# Patient Record
Sex: MALE | Race: WHITE | NOT HISPANIC OR LATINO | URBAN - METROPOLITAN AREA
[De-identification: names, ages, dates, MRNs, and addresses within clinical notes are randomized per-mention and may not be internally consistent; named-entity substitution may affect disease eponyms.]

---

## 2017-07-13 DIAGNOSIS — J98.01 BRONCHOSPASM: ICD-10-CM

## 2017-07-13 RX ORDER — ALBUTEROL SULFATE 90 UG/1
2 AEROSOL, METERED RESPIRATORY (INHALATION) EVERY 6 HOURS PRN
Qty: 1 INHALER | Refills: 1 | Status: SHIPPED | OUTPATIENT
Start: 2017-07-13 | End: 2019-02-12

## 2017-07-13 NOTE — TELEPHONE ENCOUNTER
Request for medication refill:    Date of last visit at clinic: 9-23-16    Please complete refill if appropriate and CLOSE ENCOUNTER.    Closing the encounter signifies the refill is complete.    If refill has been denied, please complete the smart phrase .smirefuse and route it to the [facility] RN TRIAGE pool to inform the patient and the pharmacy.    Maria M White

## 2017-08-04 DIAGNOSIS — L85.3 XEROSIS OF SKIN: ICD-10-CM

## 2017-08-04 RX ORDER — AMMONIUM LACTATE 12 G/100G
CREAM TOPICAL 2 TIMES DAILY PRN
Qty: 385 G | Refills: 12 | Status: SHIPPED | OUTPATIENT
Start: 2017-08-04 | End: 2018-12-10

## 2017-08-04 NOTE — TELEPHONE ENCOUNTER
Last Visit:12/22/16  Next Visit: none     Assessment & Plan:  1) Hx of Melanoma in situ  - Annual skin check recommended  - No evidence of recurrent disease     2) Hx of NMSC  - No evidence of recurrent disease     RTC in 1 year for f/u, earlier for new or concerning lesions    Eber Otero MD, FAAD    Departments of Internal Medicine and Dermatology  [facility]  [phone]

## 2017-10-06 ENCOUNTER — OFFICE VISIT (OUTPATIENT)
Dept: FAMILY MEDICINE | Facility: CLINIC | Age: 62
End: 2017-10-06

## 2017-10-06 VITALS
RESPIRATION RATE: 16 BRPM | WEIGHT: 201.6 LBS | HEART RATE: 61 BPM | BODY MASS INDEX: 26.72 KG/M2 | TEMPERATURE: 98.2 F | OXYGEN SATURATION: 97 % | HEIGHT: 73 IN | DIASTOLIC BLOOD PRESSURE: 80 MMHG | SYSTOLIC BLOOD PRESSURE: 125 MMHG

## 2017-10-06 DIAGNOSIS — Z00.00 HEALTHCARE MAINTENANCE: ICD-10-CM

## 2017-10-06 DIAGNOSIS — Z00.00 ENCOUNTER FOR ROUTINE ADULT HEALTH EXAMINATION WITHOUT ABNORMAL FINDINGS: Primary | ICD-10-CM

## 2017-10-06 DIAGNOSIS — R20.8 BURNING SENSATION OF FEET: ICD-10-CM

## 2017-10-06 DIAGNOSIS — C43.9 MALIGNANT MELANOMA, UNSPECIFIED SITE (H): ICD-10-CM

## 2017-10-06 LAB
CHOLEST SERPL-MCNC: 209.7 MG/DL (ref 0–200)
CHOLEST/HDLC SERPL: 5.8 {RATIO} (ref 0–5)
GLUCOSE CASUAL: 105 MG/DL (ref 51–200)
HCV AB SERPL QL IA: NONREACTIVE
HDLC SERPL-MCNC: 36.2 MG/DL
LDLC SERPL CALC-MCNC: 144 MG/DL (ref 0–129)
PSA SERPL-ACNC: 2.59 UG/L (ref 0–4)
TRIGL SERPL-MCNC: 145.5 MG/DL (ref 0–150)
VLDL CHOLESTEROL: 29.1 MG/DL (ref 7–32)

## 2017-10-06 NOTE — PROGRESS NOTES
Male Physical Note          HPI         Concerns today:   1. Ear - crusty feeling in R ear.  2. Foot- 2/3rd toes with burning sensation on dorsum x 2 months. Got new shoes, but now does not wear. Mother with neuropathy.           Patient Active Problem List   Diagnosis     Hepatitis B immune     Melanoma (H)     Bronchospasm     BCC (basal cell carcinoma), face     LTBI (latent tuberculosis infection)     ACP (advance care planning)       Past Medical History:   Diagnosis Date     Basal cell carcinoma      Cancer (H)     melanoma - L arm - 2006     Malignant melanoma (H)      Malignant melanoma nos             Family History   Problem Relation Age of Onset     Prostate Cancer Father      Musculoskeletal Disorder Father      Asthma Son      Allergies Son      Asthma Son      Allergies Son      Asthma Son      Peripheral Neuropathy Mother      Skin Cancer No family hx of               Review of Systems:     Review of Systems:  CONSTITUTIONAL: NEGATIVE for fever, chills, change in weight  INTEGUMENTARY/SKIN: H/O MELANOMA, BCC  EYES: NEGATIVE for vision changes or irritation  ENT/MOUTH: NEGATIVE for ear, mouth and throat problems  RESP: H/O BRONCHOSPASM WITH EXERTION  CV: NEGATIVE for chest pain, palpitations or peripheral edema  GI: NEGATIVE for nausea, abdominal pain, heartburn, or change in bowel habits  : NEGATIVE for frequency, dysuria, or hematuria  MUSCULOSKELETAL: NEGATIVE for significant arthralgias or myalgia  NEURO: TOES WITH BURNING  ENDOCRINE: NEGATIVE for temperature intolerance, skin/hair changes  HEME/ALLERGY: NEGATIVE for bleeding problems  PSYCHIATRIC: NEGATIVE for changes in mood or affect  Sleep:   Do you snore most or the night (as reported by a family member)? No  Do you feel sleepy or extremely tired during most of the day? 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 Wife,     Has anyone hurt you physically, for example by pushing, hitting, slapping or kicking you or forcing you to have sex? Denies  Do you feel threatened or controlled by a partner, ex-partner or anyone in your life? Denies    Sexual Health     Sexual concerns: No   STI History: Neg      Recommended Screening     Cholesterol Level (>44 yo or at risk):  Recommended and patient accepted testing.  Colon CA Screening (>50-75 ):  UTD             Physical Exam:     Vitals: /80  Pulse 61  Temp 98.2  F (36.8  C) (Oral)  Resp 16  Ht 6' 1\" (185.4 cm)  Wt 201 lb 9.6 oz (91.4 kg)  SpO2 97%  BMI 26.6 kg/m2  BMI= Body mass index is 26.6 kg/(m^2).     Wt Readings from Last 5 Encounters:   10/06/17 201 lb 9.6 oz (91.4 kg)   09/23/16 202 lb (91.6 kg)   10/05/15 201 lb (91.2 kg)   10/11/13 194 lb 12.8 oz (88.4 kg)       GENERAL: healthy, alert and no distress  EYES: Eyes grossly normal to inspection, extraocular movements - intact, and PERRL  HENT: ear canals- cerumen in R canal; TMs- normal; Nose- normal; Mouth- no ulcers, no lesions  NECK: no tenderness, no adenopathy, no asymmetry, no masses, no stiffness; thyroid- normal to palpation  RESP: lungs clear to auscultation - no rales, no rhonchi, no wheezes  CV: regular rates and rhythm, normal S1 S2, no S3 or S4 and no murmur, no click or rub " -  ABDOMEN: soft, no tenderness, no  hepatosplenomegaly, no masses, normal bowel sounds  MS: extremities- no gross deformities noted, no edema  SKIN: no suspicious lesions, no rashes  NEURO: strength and tone- normal, sensory exam- grossly normal, mentation- intact, speech- normal, reflexes- symmetric; 2nd/3rd toes intact to monofilament although quality slightly different compared to other areas  BACK: no CVA tenderness, no paralumbar tenderness  - male: testicles- normal, no atrophy, no masses;  no inguinal hernias  PSYCH: Alert and oriented times 3; speech- coherent , normal rate and volume; able to articulate logical thoughts, able to abstract reason, no tangential thoughts, no hallucinations or delusions, affect- normal  LYMPHATICS: ant. cervical- normal, post. cervical- normal, axillary- normal, supraclavicular- normal, inguinal- normal    Assessment and Plan     1. Encounter for routine adult health examination without abnormal findings  - Glucose Casual ([facility])  - Hepatitis C antibody  - Lipid [place] ([facility])  - Prostate spec antigen screen    2. BCC (basal cell carcinoma), face  Dx in last year  F/u with derm     3. Melanoma (H)  10 yr anniversary.   F/u with derm      4. Healthcare maintenance  - VITAMIN D, CHOLECALCIFEROL, PO; Take 2,000 Units by mouth daily  - ADMIN VACCINE, INITIAL  - TDAP VACCINE (BOOSTRIX)      5. Burning sensation of feet  2nd/3rd dorsum of toes. Monitor        Options for treatment and follow-up care were reviewed with the patient. Juan Miguel Donovan and/or guardian engaged in the decision making process and verbalized understanding of the options discussed and agreed with the final plan.    Brent Gonzalez MD

## 2017-10-06 NOTE — PROGRESS NOTES
HPI:           Review of Systems:   Review of Systems          Physical Exam:     Physical Exam      Results:     Assessment and Plan

## 2017-10-06 NOTE — MR AVS SNAPSHOT
After Visit Summary   10/6/2017    Juan Miguel Donovan    MRN: [id number]           Patient Information     Date Of Birth          1955        Visit Information        Provider Department      10/6/2017 8:00 AM Brent Gonzalez MD [facility]        Today's Diagnoses     Screening for condition    -  1    Healthcare maintenance          Care Instructions      Preventive Health Recommendations  Male Ages 50 - 64    Yearly exam:             See your health care provider every year in order to  o   Review health changes.   o   Discuss preventive care.    o   Review your medicines if your doctor has prescribed any.     Have a cholesterol test every 5 years, or more frequently if you are at risk for high cholesterol/heart disease.     Have a diabetes test (fasting glucose) every three years. If you are at risk for diabetes, you should have this test more often.     Have a colonoscopy at age 50, or have a yearly FIT test (stool test). These exams will check for colon cancer.      Talk with your health care provider about whether or not a prostate cancer screening test (PSA) is right for you.    You should be tested each year for STDs (sexually transmitted diseases), if you re at risk.     Shots: Get a flu shot each year. Get a tetanus shot every 10 years.     Nutrition:    Eat at least 5 servings of fruits and vegetables daily.     Eat whole-grain bread, whole-wheat pasta and brown rice instead of white grains and rice.     Talk to your provider about Calcium and Vitamin D.     Lifestyle    Exercise for at least 150 minutes a week (30 minutes a day, 5 days a week). This will help you control your weight and prevent disease.     Limit alcohol to one drink per day.     No smoking.     Wear sunscreen to prevent skin cancer.     See your dentist every six months for an exam and cleaning.     See your eye doctor every 1 to 2 years.            Follow-ups after your visit        Who to contact   "   Please call your clinic at [phone] to:    Ask questions about your health    Make or cancel appointments    Discuss your medicines    Learn about your test results    Speak to your doctor   If you have compliments or concerns about an experience at your clinic, or if you wish to file a complaint, please contact [facility] Patient Relations at [phone] or email us at [email or web address]         Additional Information About Your Visit        GetMyRxhart Information     Allovuet gives you secure access to your electronic health record. If you see a primary care provider, you can also send messages to your care team and make appointments. If you have questions, please call your primary care clinic.  If you do not have a primary care provider, please call [phone] and they will assist you.      Glovico is an electronic gateway that provides easy, online access to your medical records. With Glovico, you can request a clinic appointment, read your test results, renew a prescription or communicate with your care team.     To access your existing account, please contact your [facility] or call [phone] for assistance.        Care EveryWhere ID     This is your Care EveryWhere ID. This could be used by other organizations to access your [facility] [place] medical records  [id number]        Your Vitals Were     Pulse Temperature Respirations Height Pulse Oximetry BMI (Body Mass Index)    61 98.2  F (36.8  C) (Oral) 16 6' 1\" (185.4 cm) 97% 26.6 kg/m2       Blood Pressure from Last 3 Encounters:   10/06/17 125/80   09/23/16 120/79   01/15/16 121/72    Weight from Last 3 Encounters:   10/06/17 201 lb 9.6 oz (91.4 kg)   09/23/16 202 lb (91.6 kg)   10/05/15 201 lb (91.2 kg)              We Performed the Following     ADMIN VACCINE, INITIAL     Glucose Casual ([facility])     Hepatitis C antibody     Lipid [place] ([facility])     Prostate spec antigen screen  "    TDAP VACCINE (BOOSTRIX)        Primary Care Provider Office Phone # Fax #    Brent Gonzalez [phone]       [address] [address]        Equal Access to Services     MASSIEL MANRIQUE : Hadii wayne nelson chevy Fuentes, waaxda luqadaha, qaybta kaalmada adejackelyn, nicole silver laJanettemara hansen. So [facility] [phone].    ATENCIÓN: Si habla español, tiene a bliss disposición servicios gratuitos de asistencia lingüística. [facility] [phone].    We comply with applicable federal civil rights laws and [place] laws. We do not discriminate on the basis of race, color, national origin, age, disability, sex, sexual orientation, or gender identity.            Thank you!     Thank you for choosing [facility]  for your care. Our goal is always to provide you with excellent care. Hearing back from our patients is one way we can continue to improve our services. Please take a few minutes to complete the written survey that you may receive in the mail after your visit with us. Thank you!             Your Updated Medication List - Protect others around you: Learn how to safely use, store and throw away your medicines at www.disposemymeds.org.          This list is accurate as of: 10/6/17  8:45 AM.  Always use your most recent med list.                   Brand Name Dispense Instructions for use Diagnosis    albuterol 108 (90 BASE) MCG/ACT Inhaler    PROAIR HFA/PROVENTIL HFA/VENTOLIN HFA    1 Inhaler    Inhale 2 puffs into the lungs every 6 hours as needed for shortness of breath / dyspnea    Bronchospasm       ammonium lactate 12 % cream    LAC-HYDRIN    385 g    Apply topically 2 times daily as needed for dry skin    Xerosis of skin       MULTIVITAMIN PO      Take 1 tablet by mouth daily    Routine history and physical examination of adult       VITAMIN D (CHOLECALCIFEROL) PO      Take 2,000 Units by mouth daily

## 2017-10-06 NOTE — PROGRESS NOTES
Male Physical Note          HPI            Review of Systems:            Social History         Sexual Health       Recommended Screening              Physical Exam:         Assessment and Plan

## 2017-11-20 ENCOUNTER — MYC MEDICAL ADVICE (OUTPATIENT)
Dept: FAMILY MEDICINE | Facility: CLINIC | Age: 62
End: 2017-11-20

## 2018-04-05 ENCOUNTER — OFFICE VISIT (OUTPATIENT)
Dept: DERMATOLOGY | Facility: CLINIC | Age: 63
End: 2018-04-05
Payer: COMMERCIAL

## 2018-04-05 DIAGNOSIS — L30.0 NUMMULAR ECZEMA: ICD-10-CM

## 2018-04-05 DIAGNOSIS — L57.0 AK (ACTINIC KERATOSIS): Primary | ICD-10-CM

## 2018-04-05 ASSESSMENT — PAIN SCALES - GENERAL: PAINLEVEL: NO PAIN (0)

## 2018-04-05 NOTE — LETTER
4/5/2018       RE: Juan Miguel Donovan  [address]  [place] [address]     Dear Colleague,    Thank you for referring your patient, Juan Miguel Donovan, to the [facility] DERMATOLOGY at [facility]. Please see a copy of my visit note below.    [facility] Dermatology Note      Dermatology Problem List:  1) Hx of NMSC  - nBCC, nasolabial fold s/p MMS 1/15/16  - nBCC, apical lip, s/p MMS 1/15/16  2) Hx of Melanoma in situ, left dorsal forearm, s/p excision 10 years ago   3 AKs  - s/p cryotherapy 4/5/18  - Has used efudex in the past  4) Nummular eczema   5) Rivera's disease     Encounter Date: Apr 5, 2018    CC:  Chief Complaint   Patient presents with     Skin Check     Mr. Donovan is here today for a skin cancer exam. He has some lesions on the arms and legs that he would like checked as well as a lesion on the left cheek.        History of Present Illness:  Mr. Juan Miguel Donovan is a 62 year old male who presents as a follow-up for a skin check. The patient was last seen 12/22/16 when there was no evidence of recurrent disease. He has no now concerning areas.  He does report that over the last few months he has had pruritic spots on both his arms and lower legs. He tried OTC 1% hydrocortisone cream without improvement. He does not moisturize after showers.     Past Medical History:   Patient Active Problem List   Diagnosis     Hepatitis B immune     Melanoma (H)     Bronchospasm     BCC (basal cell carcinoma), face     LTBI (latent tuberculosis infection)     ACP (advance care planning)     Burning sensation of feet     Past Medical History:   Diagnosis Date     Basal cell carcinoma      Cancer (H)     melanoma - L arm - 2006     Malignant melanoma (H)      Malignant melanoma nos      Past Surgical History:   Procedure Laterality Date     BIOPSY OF SKIN LESION       GENITOURINARY SURGERY      vasectomy     MOHS MICROGRAPHIC PROCEDURE       ORTHOPEDIC SURGERY       flexor tendon surgery R hand - childhood     Social History:  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     Family History:  There is no family history of skin cancer.    Medications:  Current Outpatient Prescriptions   Medication Sig Dispense Refill     VITAMIN D, CHOLECALCIFEROL, PO Take 2,000 Units by mouth daily       ammonium lactate (LAC-HYDRIN) 12 % cream Apply topically 2 times daily as needed for dry skin 385 g 12     albuterol (PROAIR HFA/PROVENTIL HFA/VENTOLIN HFA) 108 (90 BASE) MCG/ACT Inhaler Inhale 2 puffs into the lungs every 6 hours as needed for shortness of breath / dyspnea 1 Inhaler 1     Multiple Vitamins-Minerals (MULTIVITAMIN OR) Take 1 tablet by mouth daily       No Known Allergies      Review of Systems:  -Constitutional: The patient denies fatigue, fevers, chills, unintended weight loss, and night sweats.  -Skin: As above in HPI. No additional skin concerns.    Physical exam:  Vitals: There were no vitals taken for this visit.  GEN: This is a well developed, well-nourished male in no acute distress, in a pleasant mood.    SKIN: Total skin excluding the undergarment areas was performed. The exam included the head/face, neck, both arms, chest, back, abdomen, both legs, digits and/or nails.   -On both upper extremities and lower extremities there are distinct bright pink coin plaques with scale and   -Scaly pink macule with a gritty texture on the crown of the scalp   -Numerous distinct red papules scattered across the back and a few on the chest   -Well-healed scar left forearm with no nodularity or pigment recurrence  -Well-healed scar on nasolabial fold and apical lip s/p MMS  -Multiple homogenous and symmetric pigmented nevi, no that appeared atypical on clinically   -No other lesions of concern on areas examined.     Impression/Plan:  1. History of melanoma in situ on L arm - no evidence of recurrent disease       Continue annual skin check      Reviewed sun protective measures    2. Hx of NMSC - no evidence of recurrent disease     Continue annual skin check     Reassurance    3. Nummular eczema - new this winter     Start triamcinolone acetonide ointment     Reviewed important of moisturizing after showers/baths     4. Actinic keratosis - on scalp    Cryotherapy procedure note (performed by faculty): After verbal consent and discussion of risks and benefits including but no limited to dyspigmentation/scar, blister, infection, recurrence, crown of the scalp was treated with 1-2mm freeze border for 2 cycles with liquid nitrogen. Post cryotherapy instructions were provided.     5.  [place]'s disease - Asymptomatic.     Topical steroids offered but patient refused at this time     Follow-up in 1 year, earlier for new or changing lesions.     Staff Involved:  Scribed by Aleta Sloan, MS3 for Dr. Otto Lazcano.      The medical student acted as a scribe with respect to this patient.  I have performed all the key elements of the history and physical, as well as all procedures.    Otto Lazcano MD  Dermatology Attending

## 2018-04-05 NOTE — PROGRESS NOTES
[facility] Dermatology Note      Dermatology Problem List:  1) Hx of NMSC  - nBCC, nasolabial fold s/p MMS 1/15/16  - nBCC, apical lip, s/p MMS 1/15/16  2) Hx of Melanoma in situ, left dorsal forearm, s/p excision 10 years ago   3 AKs  - s/p cryotherapy 4/5/18  - Has used efudex in the past  4) Nummular eczema   5) Rivera's disease     Encounter Date: Apr 5, 2018    CC:  Chief Complaint   Patient presents with     Skin Check     Mr. Donovan is here today for a skin cancer exam. He has some lesions on the arms and legs that he would like checked as well as a lesion on the left cheek.        History of Present Illness:  Mr. Juan Miguel Donovan is a 62 year old male who presents as a follow-up for a skin check. The patient was last seen 12/22/16 when there was no evidence of recurrent disease. He has no now concerning areas.  He does report that over the last few months he has had pruritic spots on both his arms and lower legs. He tried OTC 1% hydrocortisone cream without improvement. He does not moisturize after showers.     Past Medical History:   Patient Active Problem List   Diagnosis     Hepatitis B immune     Melanoma (H)     Bronchospasm     BCC (basal cell carcinoma), face     LTBI (latent tuberculosis infection)     ACP (advance care planning)     Burning sensation of feet     Past Medical History:   Diagnosis Date     Basal cell carcinoma      Cancer (H)     melanoma - L arm - 2006     Malignant melanoma (H)      Malignant melanoma nos      Past Surgical History:   Procedure Laterality Date     BIOPSY OF SKIN LESION       GENITOURINARY SURGERY      vasectomy     MOHS MICROGRAPHIC PROCEDURE       ORTHOPEDIC SURGERY      flexor tendon surgery R hand - childhood     Social History:  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     Family History:  There is no family history of skin cancer.    Medications:  Current Outpatient Prescriptions   Medication Sig  Dispense Refill     VITAMIN D, CHOLECALCIFEROL, PO Take 2,000 Units by mouth daily       ammonium lactate (LAC-HYDRIN) 12 % cream Apply topically 2 times daily as needed for dry skin 385 g 12     albuterol (PROAIR HFA/PROVENTIL HFA/VENTOLIN HFA) 108 (90 BASE) MCG/ACT Inhaler Inhale 2 puffs into the lungs every 6 hours as needed for shortness of breath / dyspnea 1 Inhaler 1     Multiple Vitamins-Minerals (MULTIVITAMIN OR) Take 1 tablet by mouth daily       No Known Allergies      Review of Systems:  -Constitutional: The patient denies fatigue, fevers, chills, unintended weight loss, and night sweats.  -Skin: As above in HPI. No additional skin concerns.    Physical exam:  Vitals: There were no vitals taken for this visit.  GEN: This is a well developed, well-nourished male in no acute distress, in a pleasant mood.    SKIN: Total skin excluding the undergarment areas was performed. The exam included the head/face, neck, both arms, chest, back, abdomen, both legs, digits and/or nails.   -On both upper extremities and lower extremities there are distinct bright pink coin plaques with scale and   -Scaly pink macule with a gritty texture on the crown of the scalp   -Numerous distinct red papules scattered across the back and a few on the chest   -Well-healed scar left forearm with no nodularity or pigment recurrence  -Well-healed scar on nasolabial fold and apical lip s/p MMS  -Multiple homogenous and symmetric pigmented nevi, no that appeared atypical on clinically   -No other lesions of concern on areas examined.     Impression/Plan:  1. History of melanoma in situ on L arm - no evidence of recurrent disease       Continue annual skin check     Reviewed sun protective measures    2. Hx of NMSC - no evidence of recurrent disease     Continue annual skin check     Reassurance    3. Nummular eczema - new this winter     Start triamcinolone acetonide ointment     Reviewed important of moisturizing after showers/baths      4. Actinic keratosis - on scalp    Cryotherapy procedure note (performed by faculty): After verbal consent and discussion of risks and benefits including but no limited to dyspigmentation/scar, blister, infection, recurrence, crown of the scalp was treated with 1-2mm freeze border for 2 cycles with liquid nitrogen. Post cryotherapy instructions were provided.     5.  [place]'s disease - Asymptomatic.     Topical steroids offered but patient refused at this time     Follow-up in 1 year, earlier for new or changing lesions.     Staff Involved:  Scribed by Aleta Sloan, MS3 for Dr. Otto Lazcano.      The medical student acted as a scribe with respect to this patient.  I have performed all the key elements of the history and physical, as well as all procedures.    Otto Lazcano MD  Dermatology Attending

## 2018-04-05 NOTE — NURSING NOTE
Chief Complaint   Patient presents with     Skin Check     Mr. Donovan is here today for a skin cancer exam. He has some lesions on the arms and legs that he would like checked as well as a lesion on the left cheek.      Guillermina Waggoner, CMA

## 2018-04-05 NOTE — MR AVS SNAPSHOT
After Visit Summary   4/5/2018    Juan Miguel Donovan    MRN: [id number]           Patient Information     Date Of Birth          1955        Visit Information        Provider Department      4/5/2018 11:45 AM Otto Lazcano MD [facility]        Today's Diagnoses     AK (actinic keratosis)    -  1    Nummular eczema           Follow-ups after your visit        Who to contact     Please call your clinic at [phone] to:    Ask questions about your health    Make or cancel appointments    Discuss your medicines    Learn about your test results    Speak to your doctor            Additional Information About Your Visit        MyChart Information     e|tab gives you secure access to your electronic health record. If you see a primary care provider, you can also send messages to your care team and make appointments. If you have questions, please call your primary care clinic.  If you do not have a primary care provider, please call [phone] and they will assist you.      e|tab is an electronic gateway that provides easy, online access to your medical records. With e|tab, you can request a clinic appointment, read your test results, renew a prescription or communicate with your care team.     To access your existing account, please contact your [facility] or call [phone] for assistance.        Care EveryWhere ID     This is your Care EveryWhere ID. This could be used by other organizations to access your [place] medical records  [id number]         Blood Pressure from Last 3 Encounters:   10/06/17 125/80   09/23/16 120/79   01/15/16 121/72    Weight from Last 3 Encounters:   10/06/17 91.4 kg (201 lb 9.6 oz)   09/23/16 91.6 kg (202 lb)   10/05/15 91.2 kg (201 lb)              We Performed the Following     DESTRUCT PREMALIGNANT LESION, FIRST        Primary Care Provider Office Phone # Fax #    Brent Gonzalez [phone]        [address] [address]        Equal Access to Services     BAOYAMILE HILARIA : Hadii aad ku hadmariliachristelle Fuentes, aba tran, ruthkhoi lunagrahamcarlos edwardsyamilecarlos, nicole mancilla posheridan salinasjanayjayne hansen. So [facility] [phone].    ATENCIÓN: Si habla español, tiene a bliss disposición servicios gratuitos de asistencia lingüística. Llame al [phone].    We comply with applicable federal civil rights laws and [place] laws. We do not discriminate on the basis of race, color, national origin, age, disability, sex, sexual orientation, or gender identity.            Thank you!     Thank you for choosing [facility] DERMAT[facility]  for your care. Our goal is always to provide you with excellent care. Hearing back from our patients is one way we can continue to improve our services. Please take a few minutes to complete the written survey that you may receive in the mail after your visit with us. Thank you!             Your Updated Medication List - Protect others around you: Learn how to safely use, store and throw away your medicines at www.disposemymeds.org.          This list is accurate as of 4/5/18 11:59 PM.  Always use your most recent med list.                   Brand Name Dispense Instructions for use Diagnosis    albuterol 108 (90 Base) MCG/ACT Inhaler    PROAIR HFA/PROVENTIL HFA/VENTOLIN HFA    1 Inhaler    Inhale 2 puffs into the lungs every 6 hours as needed for shortness of breath / dyspnea    Bronchospasm       ammonium lactate 12 % cream    LAC-HYDRIN    385 g    Apply topically 2 times daily as needed for dry skin    Xerosis of skin       MULTIVITAMIN PO      Take 1 tablet by mouth daily    Routine history and physical examination of adult       VITAMIN D (CHOLECALCIFEROL) PO      Take 2,000 Units by mouth daily    Healthcare maintenance

## 2018-04-10 PROBLEM — L30.0 NUMMULAR ECZEMA: Status: ACTIVE | Noted: 2018-04-10

## 2018-04-10 PROBLEM — L57.0 AK (ACTINIC KERATOSIS): Status: ACTIVE | Noted: 2018-04-10

## 2018-09-04 DIAGNOSIS — L11.1 GROVER'S DISEASE: ICD-10-CM

## 2018-09-04 RX ORDER — TRIAMCINOLONE ACETONIDE 1 MG/G
OINTMENT TOPICAL
Qty: 80 G | Refills: 1 | Status: SHIPPED | OUTPATIENT
Start: 2018-09-04 | End: 2019-01-28

## 2018-09-04 NOTE — TELEPHONE ENCOUNTER
triamcinolone (KENALOG) 0.1 %   Last Written Prescription Date:  18  Last Fill Quantity: 80G,   # refills: 1  Last Office Visit : 18  Future Office visit:  NONE    Medication requested: triamcinolone (KENALOG) 0.1 %    Last refilled: 18  Qty: 80   MED RECOM.  Start triamcinolone acetonide ointment    Last seen: 18  RTC: 1 YEAR  Next appt:NONE   **Routing refill request to provider for review/approval because:  CONTINUE RF MED ?  NO PENDING APPT.  Scheduling has been notified to contact the pt for appointment.     .

## 2018-09-04 NOTE — TELEPHONE ENCOUNTER
Received request for steroid cream as the resident on call. Reviewed patient's chart and attached communication. 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 RTC 1 year. After reviewing the medication list and assessment and plan from last visit, the request was approved. Will send TAC 0.1% ointment.      Fide Jamison  PGY-2 Dermatology Resident  [facility] Department of Dermatology

## 2018-09-04 NOTE — TELEPHONE ENCOUNTER
Received request for steroid cream as the resident on call. Reviewed patient's chart and attached communication. 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 RTC 1 year. After reviewing the medication list and assessment and plan from last visit, the request was approved. Will send TAC 0.1% ointment.      Fide Jamison  PGY-2 Dermatology Resident  [facility] Department of Dermatology

## 2018-12-10 DIAGNOSIS — L85.3 XEROSIS OF SKIN: ICD-10-CM

## 2018-12-12 RX ORDER — AMMONIUM LACTATE 12 G/100G
CREAM TOPICAL 2 TIMES DAILY PRN
Qty: 385 G | Refills: 2 | Status: SHIPPED | OUTPATIENT
Start: 2018-12-12 | End: 2019-11-13

## 2019-01-28 DIAGNOSIS — L11.1 GROVER'S DISEASE: ICD-10-CM

## 2019-01-29 RX ORDER — TRIAMCINOLONE ACETONIDE 1 MG/G
OINTMENT TOPICAL
Qty: 80 G | Refills: 0 | Status: SHIPPED | OUTPATIENT
Start: 2019-01-29 | End: 2019-03-08

## 2019-01-29 NOTE — TELEPHONE ENCOUNTER
Last Clinic Visit:  4/5/18  NV:NONE  RF UNTIL RTC DATE  Scheduling has been notified to contact the pt for appointment.

## 2019-02-11 DIAGNOSIS — J98.01 BRONCHOSPASM: ICD-10-CM

## 2019-02-11 NOTE — TELEPHONE ENCOUNTER

## 2019-02-12 RX ORDER — ALBUTEROL SULFATE 90 UG/1
2 AEROSOL, METERED RESPIRATORY (INHALATION) EVERY 6 HOURS PRN
Qty: 1 INHALER | Refills: 1 | Status: SHIPPED | OUTPATIENT
Start: 2019-02-12 | End: 2020-01-24

## 2019-02-15 ENCOUNTER — DOCUMENTATION ONLY (OUTPATIENT)
Dept: CARE COORDINATION | Facility: CLINIC | Age: 64
End: 2019-02-15

## 2019-03-07 ENCOUNTER — OFFICE VISIT (OUTPATIENT)
Dept: DERMATOLOGY | Facility: CLINIC | Age: 64
End: 2019-03-07
Payer: COMMERCIAL

## 2019-03-07 DIAGNOSIS — Z85.828 HISTORY OF NONMELANOMA SKIN CANCER: ICD-10-CM

## 2019-03-07 DIAGNOSIS — L57.0 AK (ACTINIC KERATOSIS): Primary | ICD-10-CM

## 2019-03-07 DIAGNOSIS — Z86.006 HISTORY OF MELANOMA IN SITU: ICD-10-CM

## 2019-03-07 ASSESSMENT — PAIN SCALES - GENERAL: PAINLEVEL: NO PAIN (0)

## 2019-03-07 NOTE — LETTER
3/7/2019       RE: Juan Miguel Donovan  [address]  [place] [address]     Dear Colleague,    Thank you for referring your patient, Juan Miguel Donovan, to the [facility] DERMATOLOGY at [facility]. Please see a copy of my visit note below.    [facility] Dermatology Note      Dermatology Problem List:  1) Hx of NMSC  - nBCC, nasolabial fold s/p MMS 1/15/16  - nBCC, apical lip, s/p MMS 1/15/16  2) Hx of Melanoma in situ, left dorsal forearm, s/p excision 10 years ago   3 AKs  - s/p cryotherapy 4/5/18  - Has used efudex in the past  4) Nummular eczema   5) Rivera's disease         Encounter Date: Mar 7, 2019    CC:  Chief Complaint   Patient presents with     Skin Check     Personal hx of BCC and melanoma in-situ. Juan Miguel has a few spots of concern today.         History of Present Illness:  Mr. Juan Miguel Donovan is a 63 year old male with h/o melanoma in situ on dorsal left arm s/p excision >10 years ago and nBCC x2 s/p excision in 2016 who presents as a follow-up for skin check. The patient was last seen 4/5/18 when no concerning lesions were identified and he was continued on triamcinolone ointment for nummular eczema. Since last visit, he has been doing well and denies any new moles or lesions that bleeding or not healing. He does report some dry spots on his face and some flares of nummular eczema on his legs. He currently uses triamcinolone ointment on his eczema with improvement in symptoms. He is otherwise healthy and denies any other concerning lesions.     Past Medical History:   Patient Active Problem List   Diagnosis     Hepatitis B immune     Melanoma (H)     Bronchospasm     BCC (basal cell carcinoma), face     LTBI (latent tuberculosis infection)     ACP (advance care planning)     Burning sensation of feet     AK (actinic keratosis)     Nummular eczema     Past Medical History:   Diagnosis Date     Basal cell carcinoma      Cancer (H)     melanoma  - L arm - 2006     Malignant melanoma (H)      Malignant melanoma nos      Past Surgical History:   Procedure Laterality Date     BIOPSY OF SKIN LESION       GENITOURINARY SURGERY      vasectomy     MOHS MICROGRAPHIC PROCEDURE       ORTHOPEDIC SURGERY      flexor tendon surgery R hand - childhood       Social History:  Patient reports that  has never smoked. he has never used smokeless tobacco. He reports that he drinks alcohol. He reports that he does not use drugs.    Family History:  Family History   Problem Relation Age of Onset     Prostate Cancer Father      Musculoskeletal Disorder Father      Asthma Son      Allergies Son      Asthma Son      Allergies Son      Asthma Son      Peripheral Neuropathy Mother      Skin Cancer No family hx of        Medications:  Current Outpatient Medications   Medication Sig Dispense Refill     albuterol (PROAIR HFA/PROVENTIL HFA/VENTOLIN HFA) 108 (90 Base) MCG/ACT inhaler Inhale 2 puffs into the lungs every 6 hours as needed for shortness of breath / dyspnea 1 Inhaler 1     ammonium lactate (AMLACTIN) 12 % external cream APPLY TOPICALLY 2 TIMES DAILY AS NEEDED FOR DRY SKIN 385 g 2     Multiple Vitamins-Minerals (MULTIVITAMIN OR) Take 1 tablet by mouth daily       triamcinolone (KENALOG) 0.1 % external ointment APPLY TOPICALLY TWICE DAILY TO THE AFFECTED AREA UNTIL SMOOTH, NOT FOR FACE,GROIN OR AXILLAE* Call clinic to schedule  MD APPT 80 g 0     VITAMIN D, CHOLECALCIFEROL, PO Take 2,000 Units by mouth daily       No Known Allergies      Review of Systems:  -As per HPI  -Constitutional: Otherwise feeling well today, in usual state of health.  -HEENT: Patient denies nonhealing oral sores.  -Skin: As above in HPI. No additional skin concerns.    Physical exam:  Vitals: There were no vitals taken for this visit.  GEN: This is a well developed, well-nourished male in no acute distress, in a pleasant mood.    SKIN: Full skin, which includes the head/face, both arms, chest, back,  abdomen,both legs, genitalia and/or groin buttocks, digits and/or nails, was examined.  -Bright pink nummular plaques on bilateral lower extremities with scaling.  -Scattered benign nevi over trunk and extremities, homogenous and symmetric, <4mm  -2mm erythematous papule on posterior neck near hairline  -Numerous tan/brown waxy, stuck on plaques over trunk and face  -Multiple scattered bright red domed papules over trunk  -White, rough scaly plaque on right ear lobe  -Well healed scar on L forearm w/ no nodularity or pigment recurrence  -Well healed scar on nasolabial fold and apical lip  -No other lesions of concern on areas examined.     Impression/Plan:  1. Actinic keratosis, right ear    Cryotherapy procedure note (performed by faculty): After verbal consent and discussion of risks and benefits including but no limited to dyspigmentation/scar, blister, infection, recurrence, 1 AK on right ear lobe was treated with 1-2mm freeze border for 2 cycles with liquid nitrogen. Post cryotherapy instructions were provided.     2. Nummular eczema, bilateral lower extremities    Continue triamcinolone ointment bid and daily moisturizing    3. History of melanoma in situ on L arm, no evidence of recurrence.    Sun precaution was advised including the use of sun screens of SPF 30 or higher, sun protective clothing, and avoidance of tanning beds.    Continue annual skin check    4. History of nonmelanoma skin cancer, no clincial evidence of recurrence    Sun precaution was advised including the use of sun screens of SPF 30 or higher, sun protective clothing, and avoidance of tanning beds.    Continue annual skin check    Follow-up in 1 year, earlier for new or changing lesions.     on close of this encounter.       Staff Involved:  ILesa MS4, saw and examined the patient in the presence of Dr. Lazcano.    The medical student acted as a scribe with respect to this patient.  I have performed all the key elements of the  history and physical, as well as all procedures.    Again, thank you for allowing me to participate in the care of your patient.      Sincerely,    Otto Lazcano MD    CC Brent Gonzalez MD  [address] [address]

## 2019-03-07 NOTE — NURSING NOTE
Dermatology Rooming Note    Juan Miguel Donovan's goals for this visit include:   Chief Complaint   Patient presents with     Skin Check     Personal hx of BCC and melanoma in-situ. Juan Miguel has a few spots of concern today.     Alina Duran, CMA

## 2019-03-07 NOTE — PATIENT INSTRUCTIONS
Cryotherapy    What is it?    Use of a very cold liquid, such as liquid nitrogen, to freeze and destroy abnormal skin cells that need to be removed    What should I expect?    Tenderness and redness    A small blister that might grow and fill with dark purple blood. There may be crusting.    More than one treatment may be needed if the lesions do not go away.    How do I care for the treated area?    Gently wash the area with your hands when bathing.    Use a thin layer of Vaseline to help with healing. You may use a Band-Aid.     The area should heal within 7-10 days and may leave behind a pink or lighter color.     Do not use an antibiotic or Neosporin ointment.     You may take acetaminophen (Tylenol) for pain.     Call your Doctor if you have:    Severe pain    Signs of infection (warmth, redness, cloudy yellow drainage, and or a bad smell)    Questions or concerns    Who should I call with questions?       [facility]: [phone]       [facility]: [phone]       For urgent needs outside of business hours call the [facility] at [phone]        and ask for the dermatology resident on call

## 2019-03-07 NOTE — PROGRESS NOTES
[facility] Dermatology Note      Dermatology Problem List:  1) Hx of NMSC  - nBCC, nasolabial fold s/p MMS 1/15/16  - nBCC, apical lip, s/p MMS 1/15/16  2) Hx of Melanoma in situ, left dorsal forearm, s/p excision 10 years ago   3 AKs  - s/p cryotherapy 4/5/18  - Has used efudex in the past  4) Nummular eczema   5) Rivera's disease         Encounter Date: Mar 7, 2019    CC:  Chief Complaint   Patient presents with     Skin Check     Personal hx of BCC and melanoma in-situ. Juan Miguel has a few spots of concern today.         History of Present Illness:  Mr. Juan Miguel Donovan is a 63 year old male with h/o melanoma in situ on dorsal left arm s/p excision >10 years ago and nBCC x2 s/p excision in 2016 who presents as a follow-up for skin check. The patient was last seen 4/5/18 when no concerning lesions were identified and he was continued on triamcinolone ointment for nummular eczema. Since last visit, he has been doing well and denies any new moles or lesions that bleeding or not healing. He does report some dry spots on his face and some flares of nummular eczema on his legs. He currently uses triamcinolone ointment on his eczema with improvement in symptoms. He is otherwise healthy and denies any other concerning lesions.     Past Medical History:   Patient Active Problem List   Diagnosis     Hepatitis B immune     Melanoma (H)     Bronchospasm     BCC (basal cell carcinoma), face     LTBI (latent tuberculosis infection)     ACP (advance care planning)     Burning sensation of feet     AK (actinic keratosis)     Nummular eczema     Past Medical History:   Diagnosis Date     Basal cell carcinoma      Cancer (H)     melanoma - L arm - 2006     Malignant melanoma (H)      Malignant melanoma nos      Past Surgical History:   Procedure Laterality Date     BIOPSY OF SKIN LESION       GENITOURINARY SURGERY      vasectomy     MOHS MICROGRAPHIC PROCEDURE       ORTHOPEDIC SURGERY      flexor tendon surgery R  hand - childhood       Social History:  Patient reports that  has never smoked. he has never used smokeless tobacco. He reports that he drinks alcohol. He reports that he does not use drugs.    Family History:  Family History   Problem Relation Age of Onset     Prostate Cancer Father      Musculoskeletal Disorder Father      Asthma Son      Allergies Son      Asthma Son      Allergies Son      Asthma Son      Peripheral Neuropathy Mother      Skin Cancer No family hx of        Medications:  Current Outpatient Medications   Medication Sig Dispense Refill     albuterol (PROAIR HFA/PROVENTIL HFA/VENTOLIN HFA) 108 (90 Base) MCG/ACT inhaler Inhale 2 puffs into the lungs every 6 hours as needed for shortness of breath / dyspnea 1 Inhaler 1     ammonium lactate (AMLACTIN) 12 % external cream APPLY TOPICALLY 2 TIMES DAILY AS NEEDED FOR DRY SKIN 385 g 2     Multiple Vitamins-Minerals (MULTIVITAMIN OR) Take 1 tablet by mouth daily       triamcinolone (KENALOG) 0.1 % external ointment APPLY TOPICALLY TWICE DAILY TO THE AFFECTED AREA UNTIL SMOOTH, NOT FOR FACE,GROIN OR AXILLAE* Call clinic to schedule  MD APPT 80 g 0     VITAMIN D, CHOLECALCIFEROL, PO Take 2,000 Units by mouth daily       No Known Allergies      Review of Systems:  -As per HPI  -Constitutional: Otherwise feeling well today, in usual state of health.  -HEENT: Patient denies nonhealing oral sores.  -Skin: As above in HPI. No additional skin concerns.    Physical exam:  Vitals: There were no vitals taken for this visit.  GEN: This is a well developed, well-nourished male in no acute distress, in a pleasant mood.    SKIN: Full skin, which includes the head/face, both arms, chest, back, abdomen,both legs, genitalia and/or groin buttocks, digits and/or nails, was examined.  -Bright pink nummular plaques on bilateral lower extremities with scaling.  -Scattered benign nevi over trunk and extremities, homogenous and symmetric, <4mm  -2mm erythematous papule on posterior  neck near hairline  -Numerous tan/brown waxy, stuck on plaques over trunk and face  -Multiple scattered bright red domed papules over trunk  -White, rough scaly plaque on right ear lobe  -Well healed scar on L forearm w/ no nodularity or pigment recurrence  -Well healed scar on nasolabial fold and apical lip  -No other lesions of concern on areas examined.     Impression/Plan:  1. Actinic keratosis, right ear    Cryotherapy procedure note (performed by faculty): After verbal consent and discussion of risks and benefits including but no limited to dyspigmentation/scar, blister, infection, recurrence, 1 AK on right ear lobe was treated with 1-2mm freeze border for 2 cycles with liquid nitrogen. Post cryotherapy instructions were provided.     2. Nummular eczema, bilateral lower extremities    Continue triamcinolone ointment bid and daily moisturizing    3. History of melanoma in situ on L arm, no evidence of recurrence.    Sun precaution was advised including the use of sun screens of SPF 30 or higher, sun protective clothing, and avoidance of tanning beds.    Continue annual skin check    4. History of nonmelanoma skin cancer, no clincial evidence of recurrence    Sun precaution was advised including the use of sun screens of SPF 30 or higher, sun protective clothing, and avoidance of tanning beds.    Continue annual skin check    Follow-up in 1 year, earlier for new or changing lesions.    CC Brent Gonzalez MD  [address] [address] on close of this encounter.       Staff Involved:  I, Lesa Singh MS4, saw and examined the patient in the presence of Dr. Lazcano.    The medical student acted as a scribe with respect to this patient.  I have performed all the key elements of the history and physical, as well as all procedures.    Otto Lazcano MD  Dermatology Attending

## 2019-03-08 DIAGNOSIS — L11.1 GROVER'S DISEASE: ICD-10-CM

## 2019-03-10 PROBLEM — Z86.006 HISTORY OF MELANOMA IN SITU: Status: ACTIVE | Noted: 2019-03-10

## 2019-03-10 PROBLEM — Z85.828 HISTORY OF NONMELANOMA SKIN CANCER: Status: ACTIVE | Noted: 2019-03-10

## 2019-03-11 RX ORDER — TRIAMCINOLONE ACETONIDE 1 MG/G
OINTMENT TOPICAL
Qty: 80 G | Refills: 0 | Status: SHIPPED | OUTPATIENT
Start: 2019-03-11 | End: 2019-09-04

## 2019-09-04 ENCOUNTER — OFFICE VISIT (OUTPATIENT)
Dept: DERMATOLOGY | Facility: CLINIC | Age: 64
End: 2019-09-04
Payer: COMMERCIAL

## 2019-09-04 DIAGNOSIS — L30.0 NUMMULAR ECZEMA: ICD-10-CM

## 2019-09-04 DIAGNOSIS — Z86.006 HISTORY OF MELANOMA IN SITU: Primary | ICD-10-CM

## 2019-09-04 RX ORDER — CLOBETASOL PROPIONATE 0.5 MG/G
OINTMENT TOPICAL
Qty: 90 G | Refills: 1 | Status: SHIPPED | OUTPATIENT
Start: 2019-09-04 | End: 2020-09-18

## 2019-09-04 RX ORDER — TRIAMCINOLONE ACETONIDE 1 MG/G
OINTMENT TOPICAL
Qty: 80 G | Refills: 0 | Status: SHIPPED | OUTPATIENT
Start: 2019-09-04 | End: 2019-11-13

## 2019-09-04 ASSESSMENT — PAIN SCALES - GENERAL: PAINLEVEL: NO PAIN (0)

## 2019-09-04 NOTE — PROGRESS NOTES
[facility] Dermatology Note      Dermatology Problem List:  1) Hx of NMSC  - nBCC, nasolabial fold s/p MMS 1/15/16  - nBCC, apical lip, s/p MMS 1/15/16  2) Hx of Melanoma in situ, left dorsal forearm, s/p excision 10+ years ago   3 AKs  - s/p cryotherapy 4/5/18  - Has used efudex in the past  4) Nummular eczema   -triamcinolone 0.1%, clobetasol 0.05%   5) Mathews's disease   6) Nevus to monitor - Right anterior thigh    Encounter Date: Sep 4, 2019    CC:  Chief Complaint   Patient presents with     Skin Check     Juan Miguel is here today to have a spot on his leg looked at.          History of Present Illness:  Mr. Juan Miguel Donovan is a 64 year old male who is a return patient to the clinic and presents for a lesion of concern on his leg. He has a hx of melanoma in situ on the left forearm 10+ years ago. Currently gets annual skin exams. He also has laura's disease and ongoing nummular dermatitis. The patient was last seen on 03/07/2019 by Dr. Lazcano when cryotherapy was administered to an actinic keratosis on the right ear and triamcinolone ointment BID was continued for nummular eczema of the bilateral lower extremities.     At today's visit, the patient notes a lesion of concern on his leg. He states that the spot started off brown in color, but was previously crusty and multicolored more recently and became inflamed by his cycling shorts. At today's visit, the spot is no longer crusty or multicolored but still slightly imflamed. The patient would like reassurance that this is not a melanoma or something he should be concerned about. The patient inquires about a stronger version of triamcinolone due to patches of nummular eczema on his feet. Has been using triamcinolone, and this helps somewhat, but they continue to be bothersome despite this. Especially on the dorsal aspects of both feet. He does not note any other lesions of concern. The patient denies painful, itching, tingling or bleeding lesions  unless otherwise noted.      Past Medical History:   Patient Active Problem List   Diagnosis     Hepatitis B immune     Melanoma (H)     Bronchospasm     BCC (basal cell carcinoma), face     LTBI (latent tuberculosis infection)     ACP (advance care planning)     Burning sensation of feet     AK (actinic keratosis)     Nummular eczema     History of nonmelanoma skin cancer     History of melanoma in situ     Past Medical History:   Diagnosis Date     Basal cell carcinoma      Cancer (H)     melanoma - L arm - 2006     Malignant melanoma (H)      Malignant melanoma nos      Past Surgical History:   Procedure Laterality Date     BIOPSY OF SKIN LESION       GENITOURINARY SURGERY      vasectomy     MOHS MICROGRAPHIC PROCEDURE       ORTHOPEDIC SURGERY      flexor tendon surgery R hand - childhood       Social History:   reports that he has never smoked. He has never used smokeless tobacco. He reports that he drinks alcohol. He reports that he does not use drugs.    Family History:  Family History   Problem Relation Age of Onset     Prostate Cancer Father      Musculoskeletal Disorder Father      Asthma Son      Allergies Son      Asthma Son      Allergies Son      Asthma Son      Peripheral Neuropathy Mother      Skin Cancer No family hx of        Medications:  Current Outpatient Medications   Medication Sig Dispense Refill     albuterol (PROAIR HFA/PROVENTIL HFA/VENTOLIN HFA) 108 (90 Base) MCG/ACT inhaler Inhale 2 puffs into the lungs every 6 hours as needed for shortness of breath / dyspnea 1 Inhaler 1     ammonium lactate (AMLACTIN) 12 % external cream APPLY TOPICALLY 2 TIMES DAILY AS NEEDED FOR DRY SKIN 385 g 2     Multiple Vitamins-Minerals (MULTIVITAMIN OR) Take 1 tablet by mouth daily       triamcinolone (KENALOG) 0.1 % external ointment APPLY TOPICALLY TWICE DAILY TO THE AFFECTED AREA UNTIL SMOOTH. NOT FOR FACE/GROIN 80 g 0     VITAMIN D, CHOLECALCIFEROL, PO Take 2,000 Units by mouth daily         No Known  Allergies    Review of Systems:  -Constitutional: The patient denies fatigue, fevers, chills, unintended weight loss, and night sweats.  -HEENT: Patient denies nonhealing oral sores.  -Skin: As above in HPI. No additional skin concerns.    Physical exam:  Vitals: There were no vitals taken for this visit.  GEN: This is a well developed, well-nourished male in no acute distress, in a pleasant mood.    SKIN: Total skin excluding the undergarment areas was performed. The exam included the head/face, neck, both arms, chest, back, abdomen, both legs, digits and/or nails.   -6mm slightly indurated pink scaled papule on the right anterior thigh, appears consistent with ISK, but it is somewhat complicated by patient's nummular eczema in the same area  -Small areas of ovoid, dry patchy erythematous on the back and legs  -Scattered flesh colored, pink seborrheic keratoses on the back with additional pink papules likey consistent with [place]'s  -No other lesions of concern on areas examined.       Impression/Plan:  1. Inflamed SK -  Right anterior thigh    Reassured likely beingn    No further intervention required. Patient to report changes.     If site continues to be bothersome to patient after 1-2 weeks would re-consider bx to r/o other causes    2. Nummular eczema    Continue triamcinolone 0.1% external ointment, apply topically BID to the affected area until smooth, not for the face or groin. Refilled today.    Start clobetasol 0.05% external ointment, use on AA up to BID for no more than 2 weeks. Avoid face, neck, groin and underarms. This is for the dorsal feet which tend to be the most problematic for him in terms of his eczema    Continue with dry skin care.    3. Seborrheic keratosis, non irritated - scattered on back    Reassured benign    No further intervention required. Patient to report changes.     CC Dr. Boggs on close of this encounter.  Follow-up in 6 months for a skin check, earlier for new or changing  lesions.       Staff Involved:  Staff/Scribe    Scribe Disclosure:  I, Stephen Woodard, am serving as a scribe to document services personally performed by Mikayla Diaz PA-C, based on data collection and the provider's statements to me.     Provider Disclosure:   The documentation recorded by the scribe accurately reflects the services I personally performed and the decisions made by me.    All risks, benefits and alternatives were discussed with patient.  Patient is in agreement and understands the assessment and plan.  All questions were answered.    Mikayla Diaz PA-C  [facility]: Phone: [phone], Fax: [phone]

## 2019-09-04 NOTE — LETTER
9/4/2019       RE: Juan Miguel Donovan  [address]  [place] [facility] [address]     Dear Colleague,    Thank you for referring your patient, Juan Miguel Donovan, to the [facility] DERMATOLOGY at [facility]. Please see a copy of my visit note below.    [facility] Dermatology Note      Dermatology Problem List:  1) Hx of NMSC  - nBCC, nasolabial fold s/p MMS 1/15/16  - nBCC, apical lip, s/p MMS 1/15/16  2) Hx of Melanoma in situ, left dorsal forearm, s/p excision 10+ years ago   3 AKs  - s/p cryotherapy 4/5/18  - Has used efudex in the past  4) Nummular eczema   -triamcinolone 0.1%, clobetasol 0.05%   5) [place]'s disease   6) Nevus to monitor - Right anterior thigh    Encounter Date: Sep 4, 2019    CC:  Chief Complaint   Patient presents with     Skin Check     Juan Miguel is here today to have a spot on his leg looked at.          History of Present Illness:  Mr. Juan Miguel Donovan is a 64 year old male who is a return patient to the clinic and presents for a lesion of concern on his leg. He has a hx of melanoma in situ on the left forearm 10+ years ago. Currently gets annual skin exams. He also has laura's disease and ongoing nummular dermatitis. The patient was last seen on 03/07/2019 by Dr. Lazcano when cryotherapy was administered to an actinic keratosis on the right ear and triamcinolone ointment BID was continued for nummular eczema of the bilateral lower extremities.     At today's visit, the patient notes a lesion of concern on his leg. He states that the spot started off brown in color, but was previously crusty and multicolored more recently and became inflamed by his cycling shorts. At today's visit, the spot is no longer crusty or multicolored but still slightly imflamed. The patient would like reassurance that this is not a melanoma or something he should be concerned about. The patient inquires about a stronger version of triamcinolone due to patches of  nummular eczema on his feet. Has been using triamcinolone, and this helps somewhat, but they continue to be bothersome despite this. Especially on the dorsal aspects of both feet. He does not note any other lesions of concern. The patient denies painful, itching, tingling or bleeding lesions unless otherwise noted.      Past Medical History:   Patient Active Problem List   Diagnosis     Hepatitis B immune     Melanoma (H)     Bronchospasm     BCC (basal cell carcinoma), face     LTBI (latent tuberculosis infection)     ACP (advance care planning)     Burning sensation of feet     AK (actinic keratosis)     Nummular eczema     History of nonmelanoma skin cancer     History of melanoma in situ     Past Medical History:   Diagnosis Date     Basal cell carcinoma      Cancer (H)     melanoma - L arm - 2006     Malignant melanoma (H)      Malignant melanoma nos      Past Surgical History:   Procedure Laterality Date     BIOPSY OF SKIN LESION       GENITOURINARY SURGERY      vasectomy     MOHS MICROGRAPHIC PROCEDURE       ORTHOPEDIC SURGERY      flexor tendon surgery R hand - childhood       Social History:   reports that he has never smoked. He has never used smokeless tobacco. He reports that he drinks alcohol. He reports that he does not use drugs.    Family History:  Family History   Problem Relation Age of Onset     Prostate Cancer Father      Musculoskeletal Disorder Father      Asthma Son      Allergies Son      Asthma Son      Allergies Son      Asthma Son      Peripheral Neuropathy Mother      Skin Cancer No family hx of        Medications:  Current Outpatient Medications   Medication Sig Dispense Refill     albuterol (PROAIR HFA/PROVENTIL HFA/VENTOLIN HFA) 108 (90 Base) MCG/ACT inhaler Inhale 2 puffs into the lungs every 6 hours as needed for shortness of breath / dyspnea 1 Inhaler 1     ammonium lactate (AMLACTIN) 12 % external cream APPLY TOPICALLY 2 TIMES DAILY AS NEEDED FOR DRY SKIN 385 g 2     Multiple  Vitamins-Minerals (MULTIVITAMIN OR) Take 1 tablet by mouth daily       triamcinolone (KENALOG) 0.1 % external ointment APPLY TOPICALLY TWICE DAILY TO THE AFFECTED AREA UNTIL SMOOTH. NOT FOR FACE/GROIN 80 g 0     VITAMIN D, CHOLECALCIFEROL, PO Take 2,000 Units by mouth daily         No Known Allergies    Review of Systems:  -Constitutional: The patient denies fatigue, fevers, chills, unintended weight loss, and night sweats.  -HEENT: Patient denies nonhealing oral sores.  -Skin: As above in HPI. No additional skin concerns.    Physical exam:  Vitals: There were no vitals taken for this visit.  GEN: This is a well developed, well-nourished male in no acute distress, in a pleasant mood.    SKIN: Total skin excluding the undergarment areas was performed. The exam included the head/face, neck, both arms, chest, back, abdomen, both legs, digits and/or nails.   -6mm slightly indurated pink scaled papule on the right anterior thigh, appears consistent with ISK, but it is somewhat complicated by patient's nummular eczema in the same area  -Small areas of ovoid, dry patchy erythematous on the back and legs  -Scattered flesh colored, pink seborrheic keratoses on the back with additional pink papules likey consistent with [place]'s  -No other lesions of concern on areas examined.       Impression/Plan:  1. Inflamed SK -  Right anterior thigh    Reassured likely beingn    No further intervention required. Patient to report changes.     If site continues to be bothersome to patient after 1-2 weeks would re-consider bx to r/o other causes    2. Nummular eczema    Continue triamcinolone 0.1% external ointment, apply topically BID to the affected area until smooth, not for the face or groin. Refilled today.    Start clobetasol 0.05% external ointment, use on AA up to BID for no more than 2 weeks. Avoid face, neck, groin and underarms. This is for the dorsal feet which tend to be the most problematic for him in terms of his  eczema    Continue with dry skin care.    3. Seborrheic keratosis, non irritated - scattered on back    Reassured benign    No further intervention required. Patient to report changes.     CC Dr. Boggs on close of this encounter.  Follow-up in 6 months for a skin check, earlier for new or changing lesions.       Staff Involved:  Staff/Scribe    Scribe Disclosure:  I, Stephen Woodard, am serving as a scribe to document services personally performed by Mikayla Diaz PA-C, based on data collection and the provider's statements to me.     Provider Disclosure:   The documentation recorded by the scribe accurately reflects the services I personally performed and the decisions made by me.    All risks, benefits and alternatives were discussed with patient.  Patient is in agreement and understands the assessment and plan.  All questions were answered.    Mikayla Diaz PA-C  [facility]: Phone: [phone], Fax: [phone]

## 2019-09-04 NOTE — NURSING NOTE
Dermatology Rooming Note    Juan Miguel Donovan's goals for this visit include:   Chief Complaint   Patient presents with     Skin Check     Juan Miguel is here today to have a spot on his leg looked at.      SORIN Wilson

## 2019-10-11 ENCOUNTER — OFFICE VISIT (OUTPATIENT)
Dept: FAMILY MEDICINE | Facility: CLINIC | Age: 64
End: 2019-10-11
Payer: COMMERCIAL

## 2019-10-11 VITALS
TEMPERATURE: 97.8 F | HEART RATE: 60 BPM | RESPIRATION RATE: 16 BRPM | WEIGHT: 201.8 LBS | BODY MASS INDEX: 26.74 KG/M2 | SYSTOLIC BLOOD PRESSURE: 123 MMHG | OXYGEN SATURATION: 96 % | DIASTOLIC BLOOD PRESSURE: 76 MMHG | HEIGHT: 73 IN

## 2019-10-11 DIAGNOSIS — R79.89 ELEVATED TSH: ICD-10-CM

## 2019-10-11 DIAGNOSIS — Z00.00 ROUTINE HISTORY AND PHYSICAL EXAMINATION OF ADULT: Primary | ICD-10-CM

## 2019-10-11 DIAGNOSIS — R20.8 BURNING SENSATION OF FEET: ICD-10-CM

## 2019-10-11 LAB
CHOLEST SERPL-MCNC: 208.6 MG/DL (ref 0–200)
CHOLEST/HDLC SERPL: 5.7 {RATIO} (ref 0–5)
GLUCOSE CASUAL: 88 MG/DL (ref 51–200)
HDLC SERPL-MCNC: 36.5 MG/DL
LDLC SERPL CALC-MCNC: 143 MG/DL (ref 0–129)
PSA SERPL-ACNC: 3.07 UG/L (ref 0–4)
T4 FREE SERPL-MCNC: 0.96 NG/DL (ref 0.76–1.46)
TRIGL SERPL-MCNC: 144 MG/DL (ref 0–150)
TSH SERPL DL<=0.005 MIU/L-ACNC: 6.33 MU/L (ref 0.4–4)
VIT B12 SERPL-MCNC: 407 PG/ML (ref 193–986)
VLDL CHOLESTEROL: 28.8 MG/DL (ref 7–32)

## 2019-10-11 ASSESSMENT — MIFFLIN-ST. JEOR: SCORE: 1764.24

## 2019-10-11 NOTE — PROGRESS NOTES
Male Physical Note    64 year oldmale here for CPE.    Other concerns include the followin. Tingling in toes bilaterally          PAST MEDICAL HISTORY  ALLERGIES  No Known Allergies    MEDS:   Current Outpatient Medications   Medication     albuterol (PROAIR HFA/PROVENTIL HFA/VENTOLIN HFA) 108 (90 Base) MCG/ACT inhaler     ammonium lactate (AMLACTIN) 12 % external cream     clobetasol (TEMOVATE) 0.05 % external ointment     Multiple Vitamins-Minerals (MULTIVITAMIN OR)     triamcinolone (KENALOG) 0.1 % external ointment     VITAMIN D, CHOLECALCIFEROL, PO     No current facility-administered medications for this visit.        PROBLEM LIST  Patient Active Problem List   Diagnosis     Hepatitis B immune     Melanoma (H)     Bronchospasm     BCC (basal cell carcinoma), face     LTBI (latent tuberculosis infection)     ACP (advance care planning)     Burning sensation of feet     AK (actinic keratosis)     Nummular eczema     History of nonmelanoma skin cancer     History of melanoma in situ         FAMILY HISTORY     Family History   Problem Relation Age of Onset     Prostate Cancer Father      Musculoskeletal Disorder Father      Asthma Son      Allergies Son      Asthma Son      Allergies Son      Asthma Son      Peripheral Neuropathy Mother      Skin Cancer No family hx of            SOCIAL HISTORY  Social History     Socioeconomic History     Marital status:      Spouse name: Rebeca     Number of children: 3     Years of education: Not on file     Highest education level: Not on file   Occupational History     Occupation: orthodontist     Employer: [employer]     Comment: Dental school   Social Needs     Financial resource strain: Not on file     Food insecurity:     Worry: Not on file     Inability: Not on file     Transportation needs:     Medical: Not on file     Non-medical: Not on file   Tobacco Use     Smoking status: Never Smoker     Smokeless tobacco: Never Used   Substance and Sexual  Activity     Alcohol use: Yes     Comment: socially, CAGE-neg     Drug use: No     Sexual activity: Yes     Birth control/protection: Surgical   Lifestyle     Physical activity:     Days per week: Not on file     Minutes per session: Not on file     Stress: Not on file   Relationships     Social connections:     Talks on phone: Not on file     Gets together: Not on file     Attends Confucianist service: Not on file     Active member of club or organization: Not on file     Attends meetings of clubs or organizations: Not on file     Relationship status: Not on file     Intimate partner violence:     Fear of current or ex partner: Not on file     Emotionally abused: Not on file     Physically abused: Not on file     Forced sexual activity: Not on file   Other Topics Concern     Parent/sibling w/ CABG, MI or angioplasty before 65F 55M? 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     Less travel         Mental Health  Depression/Stress  Anxiety  Other      Other  Dental Care: Yes   Seat Belt Use: Yes       PREVENTIVE SCREENING  Immunization History   Administered Date(s) Administered     HEPA 09/20/2007     HepB 12/01/1989, 01/01/1990, 06/01/1990     Influenza Not Indicated - By Hx 10/11/2017     Meningococcal (Menactra ) 09/20/2007     TDAP Vaccine (Boostrix) 10/06/2017     Tdap (Adacel,Boostrix) 09/20/2007     Yellow Fever 09/20/2007       Cholesterol Level (>46 yo or at risk):  done  Colon CA Screening (>50-75 ):  UTD  Patient chooses PSA screening.            ROS:                      CONSTITUTIONAL: no fatigue, no unexpected change in weight  SKIN: issues with peeling on hands / feet, regular melanoma survillence  EYES: no acute vision problems or changes  ENT: no ear  "problems, no mouth problems, no throat problems  RESP: no significant cough, no shortness of breath  CV: no chest pain, no palpitations, no new or worsening peripheral edema  GI: no nausea, no vomiting, no constipation, no diarrhea  : no frequency, no dysuria, no hematuria  MS: no claudication, no myalgias, no joint aches  NEURO: tingling in toes  ENDOCRINE: no temperature intolerance, no skin/hair changes      EXAMINATION:  /76   Pulse 60   Temp 97.8  F (36.6  C) (Oral)   Resp 16   Ht 1.854 m (6' 1\")   Wt 91.5 kg (201 lb 12.8 oz)   SpO2 96%   BMI 26.62 kg/m       GENERAL: healthy, alert and no distress  EYES: Eyes grossly normal to inspection, extraocular movements - intact, and PERRL  HENT: ear canals- normal; TMs- normal; Nose- normal; Mouth- no ulcers, no lesions  NECK: no tenderness, no adenopathy, no asymmetry, no masses, no stiffness; thyroid- normal to palpation  RESP: lungs clear to auscultation - no rales, no rhonchi, no wheezes  CV: regular rates and rhythm, normal S1 S2, no S3 or S4 and no murmur, no click or rub -  ABDOMEN: soft, no tenderness, no  hepatosplenomegaly, no masses, normal bowel sounds  MS: extremities- no gross deformities noted, no edema  SKIN: raw rash on L hand - ventral surface  NEURO: strength and tone- normal, sensory exam- grossly normal, mentation- intact, speech- normal, reflexes- symmetric  BACK: no CVA tenderness, no paralumbar tenderness  - male: testicles- normal, no atrophy, no masses;  no inguinal hernias  PSYCH: Alert and oriented times 3; speech- coherent , normal rate and volume; able to articulate logical thoughts, able to abstract reason, no tangential thoughts, no hallucinations or delusions, affect- normal  LYMPHATICS: ant. cervical- normal, post. cervical- normal, axillary- normal, supraclavicular- normal, inguinal- normal  Feet: monofiliment intact    LABS    Results for orders placed or performed in visit on 10/11/19   Glucose Casual ([facility]) "   Result Value Ref Range    Glucose Casual 88.0 51.0 - 200.0 mg/dL   Lipid Cascade ([facility])   Result Value Ref Range    Cholesterol 208.6 (H) 0.0 - 200.0 mg/dL    Cholesterol/HDL Ratio 5.7 (H) 0.0 - 5.0    HDL Cholesterol 36.5 (L) >40.0 mg/dL    Triglycerides 144.0 0.0 - 150.0 mg/dL    VLDL Cholesterol 28.8 7.0 - 32.0 mg/dL    LDL Cholesterol Calculated 143 (H) 0 - 129 mg/dL         ASSESSMENT/PLAN:    1. Routine history and physical examination of adult    - Glucose Casual ([facility])  - Lipid Cascade ([facility])  - Prostate spec antigen screen  - HIV Antigen Antibody Combo  - ADMIN VACCINE, INITIAL  - Pneumococcal vaccine 13 valent PCV13 IM (Prevnar) [[id number]]    2. Burning sensation of feet  Toes and some on sole of foot  Sensation intact  Likely early neuropathy    - Vitamin B12  - TSH with free T4 reflex        Kaya ISIDRO MPH

## 2019-10-14 LAB — HIV 1+2 AB+HIV1 P24 AG SERPL QL IA: NONREACTIVE

## 2019-11-07 ENCOUNTER — HEALTH MAINTENANCE LETTER (OUTPATIENT)
Age: 64
End: 2019-11-07

## 2019-11-13 DIAGNOSIS — L30.0 NUMMULAR ECZEMA: ICD-10-CM

## 2019-11-13 DIAGNOSIS — L85.3 XEROSIS OF SKIN: ICD-10-CM

## 2019-11-14 RX ORDER — TRIAMCINOLONE ACETONIDE 1 MG/G
OINTMENT TOPICAL
Qty: 80 G | Refills: 0 | Status: SHIPPED | OUTPATIENT
Start: 2019-11-14 | End: 2020-03-16

## 2019-11-14 RX ORDER — AMMONIUM LACTATE 12 G/100G
CREAM TOPICAL 2 TIMES DAILY PRN
Qty: 385 G | Refills: 2 | Status: SHIPPED | OUTPATIENT
Start: 2019-11-14 | End: 2020-03-12

## 2019-11-14 NOTE — TELEPHONE ENCOUNTER
Medication requested:  ammonium lactate (AMLACTIN) 12 % CR   Last refilled: 12/12/18  Qty: 3/5 G  : 2      Last seen: 9/4/19  RTC:6 MOS  Next appt:  NONE  Routing refill request to provider for review/approval because:  Drug not on the refill protocol      jason

## 2019-12-20 DIAGNOSIS — R79.89 ELEVATED TSH: ICD-10-CM

## 2019-12-20 LAB
T4 FREE SERPL-MCNC: 0.91 NG/DL (ref 0.76–1.46)
TSH SERPL DL<=0.005 MIU/L-ACNC: 5.79 MU/L (ref 0.4–4)

## 2020-01-15 ENCOUNTER — MYC MEDICAL ADVICE (OUTPATIENT)
Dept: FAMILY MEDICINE | Facility: CLINIC | Age: 65
End: 2020-01-15

## 2020-01-15 NOTE — TELEPHONE ENCOUNTER
Received request for repeat lab results [facility]  for TSH with free T4 reflex, author called and spoke with the patient. Tentative lab results given, notified that request for advisement will be routed to provider seen in clinic. Patient currently denies acute abnormal symptoms/illness, except tingling around toes/feet treated on 12/20/2019.    Message routed to PCP to address/advise lab results if appropriate.  Will continue to follow up.    Yara Brown RN

## 2020-01-23 DIAGNOSIS — J98.01 BRONCHOSPASM: ICD-10-CM

## 2020-01-23 NOTE — TELEPHONE ENCOUNTER
"Request for medication refill: albuterol (PROAIR HFA/PROVENTIL HFA/VENTOLIN HFA) 108 (90 Base) MCG/ACT inhaler    Providers if patient needs an appointment and you are willing to give a one month supply please refill for one month and  send a letter/MyChart using \".SMILLIMITEDREFILL\" .smillimited and route chart to \"P [facility] \" (Giving one month refill in non controlled medications is strongly recommended before denial)    If refill has been denied, meaning absolutely no refills without visit, please complete the smart phrase \".smirxrefuse\" and route it to the \"P SMI MED REFILLS\"  pool to inform the patient and the pharmacy.    Naomi Brush CMA        "

## 2020-01-24 RX ORDER — ALBUTEROL SULFATE 90 UG/1
2 AEROSOL, METERED RESPIRATORY (INHALATION) EVERY 6 HOURS PRN
Qty: 1 INHALER | Refills: 1 | Status: SHIPPED | OUTPATIENT
Start: 2020-01-24 | End: 2020-04-03

## 2020-03-09 DIAGNOSIS — L30.0 NUMMULAR ECZEMA: ICD-10-CM

## 2020-03-09 DIAGNOSIS — L85.3 XEROSIS OF SKIN: ICD-10-CM

## 2020-03-12 RX ORDER — AMMONIUM LACTATE 12 G/100G
CREAM TOPICAL 2 TIMES DAILY PRN
Qty: 385 G | Refills: 2 | Status: SHIPPED | OUTPATIENT
Start: 2020-03-12 | End: 2024-08-23

## 2020-03-12 RX ORDER — TRIAMCINOLONE ACETONIDE 1 MG/G
OINTMENT TOPICAL
Qty: 80 G | Refills: 0 | OUTPATIENT
Start: 2020-03-12

## 2020-03-12 NOTE — TELEPHONE ENCOUNTER
Last Clinic Visit:  9/14/19   NV:  NONE    F/U 6 MOS   Scheduling has been notified to contact the pt for appointment

## 2020-03-13 ENCOUNTER — TELEPHONE (OUTPATIENT)
Dept: DERMATOLOGY | Facility: CLINIC | Age: 65
End: 2020-03-13

## 2020-03-13 NOTE — TELEPHONE ENCOUNTER
Patient next appointment is with Dr. Lazcano on 6/2/20 Patient is requesting     Impression/Plan:     2. Nummular eczema    Continue triamcinolone 0.1% external ointment, apply topically BID to the affected area until smooth, not for the face or groin. Refilled today.    Start clobetasol 0.05% external ointment, use on AA up to BID for no more than 2 weeks. Avoid face, neck, groin and underarms. This is for the dorsal feet which tend to be the most problematic for him in terms of his eczema    Continue with dry skin care.

## 2020-03-13 NOTE — TELEPHONE ENCOUNTER
[facility] Call Center    Phone Message    May a detailed message be left on voicemail: yes     Reason for Call: Other: Other: PT scheduled an appt. with Otto Lazcano and is wondering if he can get his RX for triamcinolone (KENALOG) 0.1 % external ointment before that appt.  Please follow up with the Pt.       Action Taken: Message routed to:  [facility] derm    Travel Screening: Not Applicable

## 2020-03-16 DIAGNOSIS — L30.0 NUMMULAR ECZEMA: ICD-10-CM

## 2020-03-16 RX ORDER — TRIAMCINOLONE ACETONIDE 1 MG/G
OINTMENT TOPICAL
Qty: 80 G | Refills: 5 | Status: SHIPPED | OUTPATIENT
Start: 2020-03-16

## 2020-04-02 DIAGNOSIS — J98.01 BRONCHOSPASM: ICD-10-CM

## 2020-04-02 NOTE — TELEPHONE ENCOUNTER

## 2020-04-03 RX ORDER — ALBUTEROL SULFATE 90 UG/1
2 AEROSOL, METERED RESPIRATORY (INHALATION) EVERY 6 HOURS PRN
Qty: 1 INHALER | Refills: 3 | Status: SHIPPED | OUTPATIENT
Start: 2020-04-03 | End: 2021-06-16

## 2020-05-27 ENCOUNTER — TELEPHONE (OUTPATIENT)
Dept: DERMATOLOGY | Facility: CLINIC | Age: 65
End: 2020-05-27

## 2020-05-27 NOTE — TELEPHONE ENCOUNTER
"Per  \"ok for virtual, if: 1) he isn't worried that he needs a biopsy and 2) he can send photos of any concerns, as well as of his surgical scar from the melanoma in situ on the arm  If he wants to be seen in person, it's ok\"     LVM for patient to call back regarding this.   "

## 2020-06-02 ENCOUNTER — OFFICE VISIT (OUTPATIENT)
Dept: DERMATOLOGY | Facility: CLINIC | Age: 65
End: 2020-06-02
Payer: COMMERCIAL

## 2020-06-02 DIAGNOSIS — L30.0 NUMMULAR ECZEMA: ICD-10-CM

## 2020-06-02 DIAGNOSIS — Z85.828 HISTORY OF NONMELANOMA SKIN CANCER: ICD-10-CM

## 2020-06-02 DIAGNOSIS — Z86.006 HISTORY OF MELANOMA IN SITU: ICD-10-CM

## 2020-06-02 DIAGNOSIS — L57.0 AK (ACTINIC KERATOSIS): Primary | ICD-10-CM

## 2020-06-02 ASSESSMENT — PAIN SCALES - GENERAL: PAINLEVEL: NO PAIN (0)

## 2020-06-02 NOTE — LETTER
6/2/2020       RE: Juan Miguel Donovan  [address]  [facility] [address]     Dear Colleague,    Thank you for referring your patient, Juan Miguel Donovan, to the [facility] DERMATOLOGY at General acute hospital. Please see a copy of my visit note below.    [facility] Dermatology Note        Dermatology Problem List:  1) Hx of NMSC  - nBCC, nasolabial fold s/p MMS 1/15/16  - nBCC, apical lip, s/p MMS 1/15/16  2) Hx of Melanoma in situ, left dorsal forearm, s/p excision 10 years ago   3 AKs  - s/p cryotherapy 4/5/18  - Has used efudex in the past  4) Nummular eczema   5) [place]'s disease      Encounter Date: June 2, 2020    CC: followup skin check    SUBJECTIVE:  Juan Miguel is a very pleasant 64 year old male with a history of MIS of L forearm s/p WLE approximately 10 years ago, as well as history of BCC of R nasofacial sulcus, who today presents for a followup skin check.  Last seen in clinic for a FBSE on  3/7/19, at which time we treated one AK of the R lower earlobe with LN2, but did not identify any lesions concerning for melanoma or NMSC.  Today he reports that the area treated with cryotherapy initially improved but has slowly recurred, with a scaly rough texture but no nodular component, and no bleeding or pain.  Also notes a small sore area on his vertex scalp which he thinks he bumped recently.  Otherwise denies any localized areas of pain/tenderness or bleeding.  No new or changing moles, and no problems at his MIS scar site on the L arm.  Has nummular eczema and this has occasionally been difficult to control; he has recently been using clobetasol ointment and emollients with better control, but he has persistently itchy red bumps on the dorsal hands, and mostly treated areas on the dorsal feet.    ROS: no recent fevers or chills.  No cough or SOB.    PHYSICAL EXAM:  GEN Well appearing, well nourished, A&O x3  SKIN Exam of head, neck, chest, abd, back, bilateral  upper and lower extremities and buttocks   L forearm with oval well-healed surgical scar without atypical pigment or nodularity   R nasofacial sulcus with well-healed scar without nodularity or erythema   R ear lower helix with ill-defined thin flat topped pink papule 3mm with rough scale   Vertex scalp with eroded flat topped 3mm scaly papule   Dorsal hands with erythematous eczematous papules and plaques with erosions and crusts   Erythematous patches consistent with treated nummular eczema on dorsal feet   Scattered 2-3mm dark brown well-marginated macules and flat topped papules on legs and forearms, consistent with benign nevi       Impression/Plan:  1. Actinic keratosis, right ear, persistent after cryo last year; no concern for SCC at this time.  Also with traumatized AK of vertex scalp.    Cryotherapy procedure note (performed by faculty): After verbal consent and discussion of risks and benefits including but no limited to dyspigmentation/scar, blister, infection, recurrence, 1 AK on right ear lobe and 1 AK on vertex scalp were treated with 1-2mm freeze border for 2 cycles with liquid nitrogen. Post cryotherapy instructions were provided.      2. Nummular eczema, bilateral lower extremities and hands    Continue clobetasol ointment bid as needed, and daily moisturizing    Printed instructions regarding dilute bleach soaks provided today     3. History of melanoma in situ on L arm approx 10 year ago, no evidence of recurrence.    Sun precaution was advised including the use of sun screens of SPF 30 or higher, sun protective clothing, and avoidance of tanning beds.    Continue annual skin check     4. History of nonmelanoma skin cancer (BCC of R nasofacial sulcus), no clincial evidence of recurrence    Sun precaution was advised including the use of sun screens of SPF 30 or higher, sun protective clothing, and avoidance of tanning beds.    Continue annual skin check        RTC one year    Otto Lazcano  MD  Dermatology Attending      Again, thank you for allowing me to participate in the care of your patient.      Sincerely,    Otto Lazcano MD

## 2020-06-02 NOTE — PATIENT INSTRUCTIONS
Dilute bleach soaks for flaring nummular eczema:    For small areas, add 1 tablespoon of generic ([employer] brand) bleach into 1 gallon of water, soak hands and/or feet for 10 to 15 minutes before applying clobetasol    If you have more skin involved you can do a dilute bleach bath:  One-half cup of bleach into a full tub, soak for 15 minutes then apply your steroid cream and moisturize the rest of your skin      Cryotherapy    What is it?    Use of a very cold liquid, such as liquid nitrogen, to freeze and destroy abnormal skin cells that need to be removed    What should I expect?    Tenderness and redness    A small blister that might grow and fill with dark purple blood. There may be crusting.    More than one treatment may be needed if the lesions do not go away.    How do I care for the treated area?    Gently wash the area with your hands when bathing.    Use a thin layer of Vaseline to help with healing. You may use a Band-Aid.     The area should heal within 7-10 days and may leave behind a pink or lighter color.     Do not use an antibiotic or Neosporin ointment.     You may take acetaminophen (Tylenol) for pain.     Call your Doctor if you have:    Severe pain    Signs of infection (warmth, redness, cloudy yellow drainage, and or a bad smell)    Questions or concerns    Who should I call with questions?       [facility]: [phone]       [facility]: [phone]       For urgent needs outside of business hours call the [facility] at [phone]        and ask for the dermatology resident on call

## 2020-06-02 NOTE — PROGRESS NOTES
[facility] Dermatology Note        Dermatology Problem List:  1) Hx of NMSC  - nBCC, nasolabial fold s/p MMS 1/15/16  - nBCC, apical lip, s/p MMS 1/15/16  2) Hx of Melanoma in situ, left dorsal forearm, s/p excision 10 years ago   3 AKs  - s/p cryotherapy 4/5/18  - Has used efudex in the past  4) Nummular eczema   5) [place]'s disease      Encounter Date: June 2, 2020    CC: followup skin check    SUBJECTIVE:  Juan Miguel is a very pleasant 64 year old male with a history of MIS of L forearm s/p WLE approximately 10 years ago, as well as history of BCC of R nasofacial sulcus, who today presents for a followup skin check.  Last seen in clinic for a FBSE on  3/7/19, at which time we treated one AK of the R lower earlobe with LN2, but did not identify any lesions concerning for melanoma or NMSC.  Today he reports that the area treated with cryotherapy initially improved but has slowly recurred, with a scaly rough texture but no nodular component, and no bleeding or pain.  Also notes a small sore area on his vertex scalp which he thinks he bumped recently.  Otherwise denies any localized areas of pain/tenderness or bleeding.  No new or changing moles, and no problems at his MIS scar site on the L arm.  Has nummular eczema and this has occasionally been difficult to control; he has recently been using clobetasol ointment and emollients with better control, but he has persistently itchy red bumps on the dorsal hands, and mostly treated areas on the dorsal feet.    ROS: no recent fevers or chills.  No cough or SOB.    PHYSICAL EXAM:  GEN Well appearing, well nourished, A&O x3  SKIN Exam of head, neck, chest, abd, back, bilateral upper and lower extremities and buttocks   L forearm with oval well-healed surgical scar without atypical pigment or nodularity   R nasofacial sulcus with well-healed scar without nodularity or erythema   R ear lower helix with ill-defined thin flat topped pink papule 3mm with rough  scale   Vertex scalp with eroded flat topped 3mm scaly papule   Dorsal hands with erythematous eczematous papules and plaques with erosions and crusts   Erythematous patches consistent with treated nummular eczema on dorsal feet   Scattered 2-3mm dark brown well-marginated macules and flat topped papules on legs and forearms, consistent with benign nevi       Impression/Plan:  1. Actinic keratosis, right ear, persistent after cryo last year; no concern for SCC at this time.  Also with traumatized AK of vertex scalp.    Cryotherapy procedure note (performed by faculty): After verbal consent and discussion of risks and benefits including but no limited to dyspigmentation/scar, blister, infection, recurrence, 1 AK on right ear lobe and 1 AK on vertex scalp were treated with 1-2mm freeze border for 2 cycles with liquid nitrogen. Post cryotherapy instructions were provided.      2. Nummular eczema, bilateral lower extremities and hands    Continue clobetasol ointment bid as needed, and daily moisturizing    Printed instructions regarding dilute bleach soaks provided today     3. History of melanoma in situ on L arm approx 10 year ago, no evidence of recurrence.    Sun precaution was advised including the use of sun screens of SPF 30 or higher, sun protective clothing, and avoidance of tanning beds.    Continue annual skin check     4. History of nonmelanoma skin cancer (BCC of R nasofacial sulcus), no clincial evidence of recurrence    Sun precaution was advised including the use of sun screens of SPF 30 or higher, sun protective clothing, and avoidance of tanning beds.    Continue annual skin check        RTC one year    Otto Lazcano MD  Dermatology Attending

## 2020-06-02 NOTE — NURSING NOTE
Dermatology Rooming Note    Juan Miguel Donovan's goals for this visit include:   Chief Complaint   Patient presents with     Skin Check     Juan Miguel is here today for a skin check - spot on right ear he would like looked at   SORIN Soto

## 2020-09-15 DIAGNOSIS — L30.0 NUMMULAR ECZEMA: ICD-10-CM

## 2020-09-18 RX ORDER — CLOBETASOL PROPIONATE 0.5 MG/G
OINTMENT TOPICAL
Qty: 90 G | Refills: 3 | Status: SHIPPED | OUTPATIENT
Start: 2020-09-18 | End: 2021-06-08

## 2020-09-18 NOTE — TELEPHONE ENCOUNTER
Received refill request for clobetasol 0.05% as FABBY.  Reviewed chart and attached correspondence.  The patient was last seen by Dr. Lazcano in 6/2020 for nummular eczema with RTC in 1 year.  The refill request is approved.      Shaina Reyes MD  Dermatology Resident, PGY2

## 2020-09-18 NOTE — TELEPHONE ENCOUNTER
CLOBETASOL 0.05% OINTMENT       Last Written Prescription Date:  9-4-19  Last Fill Quantity: 90g,   # refills: 1  Last Office Visit : 6-2-20 ( [place])  Future Office visit:  None    2. Last Clinic note: Nummular eczema, bilateral lower extremities and hands    Continue clobetasol ointment bid as needed, and daily moisturizing      Printed instructions regarding dilute bleach soaks provided today    RTC one year  Routing refill request to provider for review/approval because:  Derm process # 3,  Clinic plan: RT 1 Year  Clinic plan does not match protocol  ? Authorize RF, ? RTC

## 2020-11-29 ENCOUNTER — HEALTH MAINTENANCE LETTER (OUTPATIENT)
Age: 65
End: 2020-11-29

## 2021-02-22 ENCOUNTER — OFFICE VISIT (OUTPATIENT)
Dept: FAMILY MEDICINE | Facility: CLINIC | Age: 66
End: 2021-02-22
Payer: COMMERCIAL

## 2021-02-22 VITALS
BODY MASS INDEX: 26.18 KG/M2 | TEMPERATURE: 98 F | OXYGEN SATURATION: 99 % | RESPIRATION RATE: 14 BRPM | HEIGHT: 74 IN | SYSTOLIC BLOOD PRESSURE: 110 MMHG | DIASTOLIC BLOOD PRESSURE: 68 MMHG | HEART RATE: 56 BPM | WEIGHT: 204 LBS

## 2021-02-22 DIAGNOSIS — R97.20 ELEVATED PROSTATE SPECIFIC ANTIGEN (PSA): ICD-10-CM

## 2021-02-22 DIAGNOSIS — Z00.00 ROUTINE GENERAL MEDICAL EXAMINATION AT A HEALTH CARE FACILITY: Primary | ICD-10-CM

## 2021-02-22 DIAGNOSIS — R79.89 ELEVATED TSH: ICD-10-CM

## 2021-02-22 DIAGNOSIS — C43.62 MALIGNANT MELANOMA OF LEFT UPPER EXTREMITY INCLUDING SHOULDER (H): ICD-10-CM

## 2021-02-22 DIAGNOSIS — E78.5 HYPERLIPIDEMIA LDL GOAL <130: ICD-10-CM

## 2021-02-22 LAB
CHOLEST SERPL-MCNC: 224.7 MG/DL (ref 0–200)
CHOLEST/HDLC SERPL: 5.2 {RATIO} (ref 0–5)
GLUCOSE SERPL-MCNC: 82 MG'DL (ref 70–99)
HBA1C MFR BLD: 5.4 % (ref 4.1–5.7)
HDLC SERPL-MCNC: 43 MG/DL
LDLC SERPL CALC-MCNC: 154 MG/DL (ref 0–129)
PSA SERPL-ACNC: 6.04 UG/L (ref 0–4)
TRIGL SERPL-MCNC: 136.6 MG/DL (ref 0–150)
TSH SERPL DL<=0.005 MIU/L-ACNC: 2.81 MU/L (ref 0.4–4)
VLDL CHOLESTEROL: 27.3 MG/DL (ref 7–32)

## 2021-02-22 PROCEDURE — G0103 PSA SCREENING: HCPCS | Performed by: FAMILY MEDICINE

## 2021-02-22 PROCEDURE — 83036 HEMOGLOBIN GLYCOSYLATED A1C: CPT | Performed by: FAMILY MEDICINE

## 2021-02-22 PROCEDURE — 84443 ASSAY THYROID STIM HORMONE: CPT | Performed by: FAMILY MEDICINE

## 2021-02-22 PROCEDURE — 36415 COLL VENOUS BLD VENIPUNCTURE: CPT | Performed by: FAMILY MEDICINE

## 2021-02-22 PROCEDURE — 80061 LIPID PANEL: CPT | Performed by: FAMILY MEDICINE

## 2021-02-22 PROCEDURE — 82947 ASSAY GLUCOSE BLOOD QUANT: CPT | Performed by: FAMILY MEDICINE

## 2021-02-22 PROCEDURE — 99396 PREV VISIT EST AGE 40-64: CPT | Performed by: FAMILY MEDICINE

## 2021-02-22 ASSESSMENT — MIFFLIN-ST. JEOR: SCORE: 1785.09

## 2021-02-22 NOTE — PROGRESS NOTES
Male Physical Note          HPI         Concerns today: No special concerns today.      Patient Active Problem List   Diagnosis     Hepatitis B immune     Melanoma (H)     Bronchospasm     BCC (basal cell carcinoma), face     LTBI (latent tuberculosis infection)     ACP (advance care planning)     Burning sensation of feet     AK (actinic keratosis)     Nummular eczema     History of nonmelanoma skin cancer     History of melanoma in situ       Past Medical History:   Diagnosis Date     Basal cell carcinoma      Cancer (H)     melanoma - L arm - 2006     Malignant melanoma (H)      Malignant melanoma nos             Family History   Problem Relation Age of Onset     Prostate Cancer Father      Musculoskeletal Disorder Father      Asthma Son      Allergies Son      Asthma Son      Allergies Son      Asthma Son      Peripheral Neuropathy Mother      Skin Cancer No family hx of               Review of Systems:     Review of Systems:  CONSTITUTIONAL: NEGATIVE for fever, chills, change in weight  INTEGUMENTARY/SKIN: NEGATIVE for worrisome rashes, moles or lesions  EYES: NEGATIVE for vision changes or irritation  ENT/MOUTH: NEGATIVE for ear, mouth and throat problems  RESP: NEGATIVE for significant cough or SOB  BREAST: NEGATIVE for masses, tenderness or discharge  CV: NEGATIVE for chest pain, palpitations or peripheral edema  GI: NEGATIVE for nausea, abdominal pain, heartburn, or change in bowel habits  : NEGATIVE for frequency, dysuria, or hematuria  MUSCULOSKELETAL: NEGATIVE for significant arthralgias or myalgia  NEURO: NEGATIVE for weakness, dizziness or paresthesias  ENDOCRINE: NEGATIVE for temperature intolerance, skin/hair changes  HEME/ALLERGY: NEGATIVE for bleeding problems  PSYCHIATRIC: NEGATIVE for changes in mood or affect  Sleep:   Do you snore most or the night (as reported by a family member)? No  Do you feel sleepy or extremely tired during most of the day? No           Social History     Social  History     Socioeconomic History     Marital status:      Spouse name: Rebeca     Number of children: 3     Years of education: Not on file     Highest education level: Not on file   Occupational History     Occupation: orthodontist     Employer: [employer]     Comment: Dental school   Social Needs     Financial resource strain: Not on file     Food insecurity     Worry: Not on file     Inability: Not on file     Transportation needs     Medical: Not on file     Non-medical: Not on file   Tobacco Use     Smoking status: Never Smoker     Smokeless tobacco: Never Used   Substance and Sexual Activity     Alcohol use: Yes     Comment: socially, CAGE-neg     Drug use: No     Sexual activity: Yes     Birth control/protection: Surgical   Lifestyle     Physical activity     Days per week: Not on file     Minutes per session: Not on file     Stress: Not on file   Relationships     Social connections     Talks on phone: Not on file     Gets together: Not on file     Attends Druze service: Not on file     Active member of club or organization: Not on file     Attends meetings of clubs or organizations: Not on file     Relationship status: Not on file     Intimate partner violence     Fear of current or ex partner: Not on file     Emotionally abused: Not on file     Physically abused: Not on file     Forced sexual activity: Not on file   Other Topics Concern     Parent/sibling w/ CABG, MI or angioplasty before 65F 55M? Not Asked   Social History Narrative    Oct 2015    3 sons     - 2 orthodontists     - 1     All own their homes        Oct 2017    Traveling frequently for professional association - end May 2019    Place near [facility]        Oct 2019    Finished with leadership in professional assoc.     Less travel       Marital Status:  Who lives in your household? Spouse      Sexual Health     Sexual concerns: No   STI History: Neg      Recommended Screening     Cholesterol Level (>46 yo  "or at risk):  Recommended and patient accepted testing.  Colon CA Screening (>50-75 ):  Recommended and patient accepted testing.    ASCVD 10 year RISK:  Calculated 02/28/21   The 10-year ASCVD risk score (Keyon KAISER Jr., et al., 2013) is: 10.9%    Values used to calculate the score:      Age: 65 years      Sex: Male      Is Non- : No      Diabetic: No      Tobacco smoker: No      Systolic Blood Pressure: 110 mmHg      Is BP treated: No      HDL Cholesterol: 43 mg/dL      Total Cholesterol: 224.7 mg/dL                History   Smoking Status     Never Smoker   Smokeless Tobacco     Never Used     BP Readings from Last 3 Encounters:   02/22/21 110/68   10/11/19 123/76   10/06/17 125/80     Lab Results   Component Value Date    CHOL 224.7 02/22/2021     Lab Results   Component Value Date    HDL 43.0 02/22/2021     Lab Results   Component Value Date     02/22/2021     Lab Results   Component Value Date    TRIG 136.6 02/22/2021     Lab Results   Component Value Date    CHOLHDLRATIO 5.2 02/22/2021     Current Outpatient Medications   Medication     albuterol (PROAIR HFA/PROVENTIL HFA/VENTOLIN HFA) 108 (90 Base) MCG/ACT inhaler     ammonium lactate (AMLACTIN) 12 % external cream     clobetasol (TEMOVATE) 0.05 % external ointment     Multiple Vitamins-Minerals (MULTIVITAMIN OR)     triamcinolone (KENALOG) 0.1 % external ointment     VITAMIN D, CHOLECALCIFEROL, PO     No current facility-administered medications for this visit.    }               Physical Exam:     Vitals: /68   Pulse 56   Temp 98  F (36.7  C) (Oral)   Resp 14   Ht 1.88 m (6' 2\")   Wt 92.5 kg (204 lb)   SpO2 99%   BMI 26.19 kg/m    BMI= Body mass index is 26.19 kg/m .  GENERAL: healthy, alert and no distress  EYES: Eyes grossly normal to inspection, extraocular movements - intact, and PERRL  HENT: ear canals- normal; TMs- normal; Nose- normal; Mouth- no ulcers, no lesions  NECK: no tenderness, no adenopathy, no " asymmetry, no masses, no stiffness; thyroid- normal to palpation  RESP: lungs clear to auscultation - no rales, no rhonchi, no wheezes  BREAST: no masses, no tenderness, no nipple discharge, no palpable axillary masses or adenopathy  CV: regular rates and rhythm, normal S1 S2, no S3 or S4 and no murmur, no click or rub -  ABDOMEN: soft, no tenderness, no  hepatosplenomegaly, no masses, normal bowel sounds  MS: extremities- no gross deformities noted, no edema  SKIN: no suspicious lesions, no rashes  NEURO: strength and tone- normal, sensory exam- grossly normal, mentation- intact, speech- normal, reflexes- symmetric  BACK: no CVA tenderness, no paralumbar tenderness  - male: testicles- normal, no atrophy, no masses;  no inguinal hernias  PSYCH: Alert and oriented times 3; speech- coherent , normal rate and volume; able to articulate logical thoughts, able to abstract reason, no tangential thoughts, no hallucinations or delusions, affect- normal  LYMPHATICS: ant. cervical- normal, post. cervical- normal, axillary- normal, supraclavicular- normal, inguinal- normal    Results for orders placed or performed in visit on 02/22/21   TSH with free T4 reflex     Status: None   Result Value Ref Range    TSH 2.81 0.40 - 4.00 mU/L   Glucose ([facility])     Status: None   Result Value Ref Range    Glucose 82.0 70.0 - 99.0 mg'dL   Hemoglobin A1c ([facility])     Status: None   Result Value Ref Range    Hemoglobin A1C 5.4 4.1 - 5.7 %   Lipid Cascade ([facility])     Status: Abnormal   Result Value Ref Range    Cholesterol 224.7 (H) 0.0 - 200.0 mg/dL    Cholesterol/HDL Ratio 5.2 (H) 0.0 - 5.0    HDL Cholesterol 43.0 >40.0 mg/dL    Triglycerides 136.6 0.0 - 150.0 mg/dL    VLDL Cholesterol 27.3 7.0 - 32.0 mg/dL    LDL Cholesterol Calculated 154 (H) 0 - 129 mg/dL   Prostate spec antigen screen     Status: Abnormal   Result Value Ref Range    PSA 6.04 (H) 0 - 4 ug/L           Assessment and Plan     1. Routine general medical examination  at a health care facility  - Glucose ([facility])  - Hemoglobin A1c ([facility])  - Lipid Cascade ([facility])  - Prostate spec antigen screen    2. Elevated TSH  Improved. In normal range  - TSH with free T4 reflex    3. Elevated prostate specific antigen (PSA)  PSA - 6.0  Repeat 2-3 months. If still elevated, refer to urology    - Prostate spec antigen screen; Future    4. Malignant melanoma of left upper extremity including shoulder (H)  Seeing derm regularly    5. Mild hyperlipidemia    ASCVD risk 10.9% - Intermediate risk  Consider statin next visit.      Options for treatment and follow-up care were reviewed with the patient. Juan Miguel Donovan and/or guardian engaged in the decision making process and verbalized understanding of the options discussed and agreed with the final plan.    Brent Gonzalez MD

## 2021-02-28 PROBLEM — R97.20 ELEVATED PROSTATE SPECIFIC ANTIGEN (PSA): Status: ACTIVE | Noted: 2021-02-28

## 2021-02-28 PROBLEM — R79.89 ELEVATED TSH: Status: ACTIVE | Noted: 2021-02-28

## 2021-05-20 DIAGNOSIS — R97.20 ELEVATED PROSTATE SPECIFIC ANTIGEN (PSA): ICD-10-CM

## 2021-05-20 LAB — PSA SERPL-ACNC: 5.78 UG/L (ref 0–4)

## 2021-05-20 PROCEDURE — 36415 COLL VENOUS BLD VENIPUNCTURE: CPT | Performed by: FAMILY MEDICINE

## 2021-05-20 PROCEDURE — G0103 PSA SCREENING: HCPCS | Performed by: FAMILY MEDICINE

## 2021-05-25 NOTE — TELEPHONE ENCOUNTER
MEDICAL RECORDS REQUEST   [facility]  Urology Clinic  [address] [place] [address]  PHONE: [phone]  Fax: [phone]        FUTURE VISIT INFORMATION                                                   Juan Miguel Donovan, : 1955 scheduled for future visit at [facility]    APPOINTMENT INFORMATION:    Date: 21    Provider:  Lynne    Reason for Visit/Diagnosis: abnormal PSA    REFERRAL INFORMATION:    Referring provider:  Lisa    Specialty: family medicine    Referring providers clinic:  [facility]    Clinic contact number:      RECORDS REQUESTED FOR VISIT                                                     NOTES  STATUS/DETAILS   OFFICE NOTE from referring provider  yes   OFFICE NOTE from other specialist  no   DISCHARGE SUMMARY from hospital  no   DISCHARGE REPORT from the ER  no   OPERATIVE REPORT  no   MEDICATION LIST  yes   LABS     URINALYSIS (UA)  no   URINE CYTOLOGY  no     PSA 5..21 (most recent)

## 2021-06-08 ENCOUNTER — OFFICE VISIT (OUTPATIENT)
Dept: DERMATOLOGY | Facility: CLINIC | Age: 66
End: 2021-06-08
Payer: COMMERCIAL

## 2021-06-08 DIAGNOSIS — Z85.828 HISTORY OF NONMELANOMA SKIN CANCER: ICD-10-CM

## 2021-06-08 DIAGNOSIS — L30.0 NUMMULAR ECZEMA: ICD-10-CM

## 2021-06-08 DIAGNOSIS — L57.0 AK (ACTINIC KERATOSIS): Primary | ICD-10-CM

## 2021-06-08 PROCEDURE — 99213 OFFICE O/P EST LOW 20 MIN: CPT | Mod: 25 | Performed by: DERMATOLOGY

## 2021-06-08 PROCEDURE — 17000 DESTRUCT PREMALG LESION: CPT | Performed by: DERMATOLOGY

## 2021-06-08 PROCEDURE — 17003 DESTRUCT PREMALG LES 2-14: CPT | Performed by: DERMATOLOGY

## 2021-06-08 RX ORDER — CLOBETASOL PROPIONATE 0.5 MG/G
OINTMENT TOPICAL
Qty: 90 G | Refills: 3 | Status: SHIPPED | OUTPATIENT
Start: 2021-06-08 | End: 2022-09-29

## 2021-06-08 ASSESSMENT — PAIN SCALES - GENERAL: PAINLEVEL: NO PAIN (0)

## 2021-06-08 NOTE — PROGRESS NOTES
CHIEF COMPLAINT:  Followup skin check and nummular eczema.    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  He also has a remote history of melanoma in situ, left dorsal forearm, more than 15 years ago.  He was last seen in our clinic on 06/02/2020, at which time we treated 2 actinic keratoses with cryotherapy.  We also provided clobetasol ointment for his nummular eczema.      Today, he reports that he and his wife have noticed a new small brown spot on his left forehead, which he thinks is benign, but would like checked.  He also has a spot on his right earlobe, which has been previously treated with cryotherapy and improved, but slowly recurred.  Otherwise, he denies any localized areas of pain, bleeding or nonhealing sores.  He does have recurrent spots of eczema on his hands and legs and clobetasol is helpful for this.  He would like to refill this if possible.  He denies any problems at his prior surgical scar sites on the face on the left forearm.    REVIEW OF SYSTEMS:  No recent fevers.    PHYSICAL EXAMINATION:    GENERAL:  This is a well-appearing, well-nourished male with a normal mood and affect, who is oriented x3.  SKIN:  A cutaneous exam of the head, neck, chest, abdomen, back, bilateral upper and lower extremities and buttocks was performed.  On the face, there are 2 linear, well-healed scars without nodularity or erythema.  On the left forearm, there is a linear to ovoid whitish scar without pigment or nodularity.  On the vertex scalp, there is a 3 mm flat rough pink scaled papule.  A similar 3 mm rough scaly pink papules is present on the mid forehead as well as a 3 mm thin scaly pink papule on the right earlobe.  Scattered on the dorsal hands, there are pink papules and thin plaques consistent with eczematous dermatitis.    ASSESSMENT AND PLAN:    1.   Remote history of melanoma in situ, left forearm.  Clinically, there is no evidence of recurrence today.  Reassurance was provided.  We discussed the importance of ongoing sun protective behaviors and annual skin exams.  2.  History of basal cell carcinoma x2 on the face.  Clinically, there is no evidence of recurrence at these sites.  Reassurance was similarly provided.  3.  Actinic keratoses x3 on the forehead, vertex scalp and right earlobe.  After informed verbal consent, each of these was treated with liquid nitrogen x5 seconds x2 cycles.  The patient tolerated this without complication.  4.  Nummular eczema.  We refilled his prescription for clobetasol ointment to be applied twice daily as needed for flareups.  We also discussed gentle skin care.  5.  He will follow up in our clinic in 1 year's time, sooner for any new or changing lesions.      Otto Lazcano MD  Dermatology Attending

## 2021-06-08 NOTE — NURSING NOTE
Dermatology Rooming Note    Juan Miguel Donovan's goals for this visit include:   Chief Complaint   Patient presents with     Skin Check     Juan Miguel is here today for a skin check- a few concerns     SORIN Stoo

## 2021-06-08 NOTE — LETTER
6/8/2021       RE: Juan Miguel Donovan  [address]  [place] [facility] [address]     Dear Colleague,    Thank you for referring your patient, Juan Miguel Donovan, to the [facility] DERMATOLOGY CLINIC [place] at [facility]. Please see a copy of my visit note below.    CHIEF COMPLAINT:  Followup skin check and nummular eczema.    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  He also has a remote history of melanoma in situ, left dorsal forearm, more than 15 years ago.  He was last seen in our clinic on 06/02/2020, at which time we treated 2 actinic keratoses with cryotherapy.  We also provided clobetasol ointment for his nummular eczema.      Today, he reports that he and his wife have noticed a new small brown spot on his left forehead, which he thinks is benign, but would like checked.  He also has a spot on his right earlobe, which has been previously treated with cryotherapy and improved, but slowly recurred.  Otherwise, he denies any localized areas of pain, bleeding or nonhealing sores.  He does have recurrent spots of eczema on his hands and legs and clobetasol is helpful for this.  He would like to refill this if possible.  He denies any problems at his prior surgical scar sites on the face on the left forearm.    REVIEW OF SYSTEMS:  No recent fevers.    PHYSICAL EXAMINATION:    GENERAL:  This is a well-appearing, well-nourished male with a normal mood and affect, who is oriented x3.  SKIN:  A cutaneous exam of the head, neck, chest, abdomen, back, bilateral upper and lower extremities and buttocks was performed.  On the face, there are 2 linear, well-healed scars without nodularity or erythema.  On the left forearm, there is a linear to ovoid whitish scar without pigment or nodularity.  On the vertex scalp, there  is a 3 mm flat rough pink scaled papule.  A similar 3 mm rough scaly pink papules is present on the mid forehead as well as a 3 mm thin scaly pink papule on the right earlobe.  Scattered on the dorsal hands, there are pink papules and thin plaques consistent with eczematous dermatitis.    ASSESSMENT AND PLAN:    1.  Remote history of melanoma in situ, left forearm.  Clinically, there is no evidence of recurrence today.  Reassurance was provided.  We discussed the importance of ongoing sun protective behaviors and annual skin exams.  2.  History of basal cell carcinoma x2 on the face.  Clinically, there is no evidence of recurrence at these sites.  Reassurance was similarly provided.  3.  Actinic keratoses x3 on the forehead, vertex scalp and right earlobe.  After informed verbal consent, each of these was treated with liquid nitrogen x5 seconds x2 cycles.  The patient tolerated this without complication.  4.  Nummular eczema.  We refilled his prescription for clobetasol ointment to be applied twice daily as needed for flareups.  We also discussed gentle skin care.  5.  He will follow up in our clinic in 1 year's time, sooner for any new or changing lesions.      Otto Lazcano MD  Dermatology Attending

## 2021-06-16 DIAGNOSIS — J98.01 BRONCHOSPASM: ICD-10-CM

## 2021-06-16 RX ORDER — ALBUTEROL SULFATE 90 UG/1
2 AEROSOL, METERED RESPIRATORY (INHALATION) EVERY 6 HOURS PRN
Qty: 8.5 G | Refills: 1 | Status: SHIPPED | OUTPATIENT
Start: 2021-06-16 | End: 2022-12-20

## 2021-06-16 NOTE — TELEPHONE ENCOUNTER

## 2021-07-08 ENCOUNTER — PRE VISIT (OUTPATIENT)
Dept: UROLOGY | Facility: CLINIC | Age: 66
End: 2021-07-08

## 2021-07-08 NOTE — TELEPHONE ENCOUNTER
Reason for visit: Consult     Relevant information: Elevated PSA    Records/imaging/labs/orders: in epic    Pt called: no    At Rooming: normal

## 2021-07-09 ENCOUNTER — PRE VISIT (OUTPATIENT)
Dept: UROLOGY | Facility: CLINIC | Age: 66
End: 2021-07-09

## 2021-07-09 ENCOUNTER — OFFICE VISIT (OUTPATIENT)
Dept: UROLOGY | Facility: CLINIC | Age: 66
End: 2021-07-09
Attending: FAMILY MEDICINE
Payer: COMMERCIAL

## 2021-07-09 VITALS
WEIGHT: 195 LBS | HEIGHT: 74 IN | DIASTOLIC BLOOD PRESSURE: 80 MMHG | BODY MASS INDEX: 25.03 KG/M2 | HEART RATE: 58 BPM | SYSTOLIC BLOOD PRESSURE: 130 MMHG

## 2021-07-09 DIAGNOSIS — R97.20 ELEVATED PROSTATE SPECIFIC ANTIGEN (PSA): Primary | ICD-10-CM

## 2021-07-09 PROCEDURE — 99203 OFFICE O/P NEW LOW 30 MIN: CPT | Performed by: STUDENT IN AN ORGANIZED HEALTH CARE EDUCATION/TRAINING PROGRAM

## 2021-07-09 ASSESSMENT — MIFFLIN-ST. JEOR: SCORE: 1744.26

## 2021-07-09 ASSESSMENT — PAIN SCALES - GENERAL: PAINLEVEL: NO PAIN (0)

## 2021-07-09 NOTE — LETTER
7/9/2021       RE: Juan Miguel Donovan  [address]  [place] [address]     Dear Colleague,    Thank you for referring your patient, Juan Miguel Donovan, to the [facility] UROLOGY CLINIC [place] at [facility]. Please see a copy of my visit note below.          Chief Complaint:    Elevated PSA (Prostate Specific Antigen)           Consult or Referral:     Mr. Juan Miguel Donovan is a 66 year old male seen at the request of Dr. Batista.         History of Present Illness:     Juan Miguel Donovan is a 66 year old male being seen for elevated PSA.  Duration of problem: 5 months  Previous treatments: none      Reviewed previous notes from Dr. Brent Gonzalez    Persisitent elavation of PSA  Family history of prostate cancer +ve  In father, however  Has no significant LUTS at the moment. No H/o recent instrumentation.             Past Medical History:     Past Medical History:   Diagnosis Date     Basal cell carcinoma      Cancer (H)     melanoma - L arm - 2006     Malignant melanoma (H)      Malignant melanoma nos             Past Surgical History:     Past Surgical History:   Procedure Laterality Date     BIOPSY OF SKIN LESION       GENITOURINARY SURGERY      vasectomy     MOHS MICROGRAPHIC PROCEDURE       ORTHOPEDIC SURGERY      flexor tendon surgery R hand - childhood            Medications     Current Outpatient Medications   Medication     albuterol (PROAIR HFA/PROVENTIL HFA/VENTOLIN HFA) 108 (90 Base) MCG/ACT inhaler     ammonium lactate (AMLACTIN) 12 % external cream     clobetasol (TEMOVATE) 0.05 % external ointment     Multiple Vitamins-Minerals (MULTIVITAMIN OR)     triamcinolone (KENALOG) 0.1 % external ointment     VITAMIN D, CHOLECALCIFEROL, PO     No current facility-administered medications for this visit.            Family History:     Family History   Problem Relation Age of Onset     Prostate Cancer Father      Musculoskeletal Disorder Father      Asthma  Son      Allergies Son      Asthma Son      Allergies Son      Asthma Son      Peripheral Neuropathy Mother      Skin Cancer No family hx of             Social History:     Social History     Socioeconomic History     Marital status:      Spouse name: Rebeca     Number of children: 3     Years of education: Not on file     Highest education level: Not on file   Occupational History     Occupation: orthodontist     Employer: [employer]     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 Not Asked   Social History Narrative    Oct 2015    3 sons     - 2 orthodontists     - 1     All own their homes        Oct 2017    Traveling frequently for professional association - end May 2019    Place near [facility]        Oct 2019    Finished with leadership in professional assoc.     Less travel     Social Determinants of Health     Financial Resource Strain:      Difficulty of Paying Living Expenses:    Food Insecurity:      Worried About Running Out of Food in the Last Year:      Ran Out of Food in the Last Year:    Transportation Needs:      Lack of Transportation (Medical):      Lack of Transportation (Non-Medical):    Physical Activity:      Days of Exercise per Week:      Minutes of Exercise per Session:    Stress:      Feeling of Stress :    Social Connections:      Frequency of Communication with Friends and Family:      Frequency of Social Gatherings with Friends and Family:      Attends Judaism Services:      Active Member of Clubs or Organizations:      Attends Club or Organization Meetings:      Marital Status:    Intimate Partner Violence:      Fear of Current or Ex-Partner:       "Emotionally Abused:      Physically Abused:      Sexually Abused:             Allergies:   Patient has no known allergies.         Review of Systems:  From intake questionnaire     Skin: negative  Eyes: negative  Ears/Nose/Throat: negative  Respiratory: No shortness of breath, dyspnea on exertion, cough, or hemoptysis  Cardiovascular: No chest pain or palpitations  Gastrointestinal: negative; no nausea/vomiting, constipation or diarrhea  Genitourinary: as per HPI  Musculoskeletal: negative  Neurologic: negative  Psychiatric: negative  Hematologic/Lymphatic/Immunologic: negative  Endocrine: negative         Physical Exam:     Patient is a 66 year old  male   Vitals: Blood pressure 130/80, pulse 58, height 1.88 m (6' 2\"), weight 88.5 kg (195 lb).  Constitutional: Body mass index is 25.04 kg/m .  Alert, no acute distress, oriented, conversant  Eyes: no scleral icterus; extraocular muscles intact, moist conjunctivae  Neck: trachea midline, no thyromegaly  Ears/nose/mouth: throat/mouth:normal, good dentition  Respiratory: no respiratory distress, or pursed lip breathing  Cardiovascular: pulses strong and intact; no obvious jugular venous distension present  Gastrointestinal: soft, nontender, no organomegaly or masses,   Lymphatics: No inguinal adenopathy  Musculoskeletal: extremities normal, no peripheral edema  Skin: no suspicious lesions or rashes  Neuro: Alert, oriented, speech and mentation normal  Psych: affect and mood normal, alert and oriented to person, place and time  Gait: Normal  : CHARLINE anodular, symmetric      Labs and Pathology:    The following labs were reviewed by me and discussed with the patient:    Significant for   Lab Results   Component Value Date    CR 1.1 10/11/2013     PSA   Date Value Ref Range Status   05/20/2021 5.78 (H) 0 - 4 ug/L Final     Comment:     Assay Method:  Chemiluminescence using Siemens Vista analyzer   02/22/2021 6.04 (H) 0 - 4 ug/L Final     Comment:     Assay Method:  " Chemiluminescence using Siemens Vista analyzer   10/11/2019 3.07 0 - 4 ug/L Final     Comment:     Assay Method:  Chemiluminescence using Siemens Vista analyzer   10/06/2017 2.59 0 - 4 ug/L Final     Comment:     Assay Method:  Chemiluminescence using Siemens Vista analyzer   09/23/2016 2.50 0 - 4 ug/L Final     Comment:     Assay Method:  Chemiluminescence using Siemens Vista analyzer   10/05/2015 2.10 0 - 4 ug/L Final   10/11/2013 1.91 0 - 4 ug/L Final     Comment:     PSA results are about 7% lower than our prior method due to a methodology   change   on August 30, 2011.   07/19/2011 1.60 0 - 4 ug/L Final   07/25/2006 1.22 0 - 4 ug/L Final             Imaging:    The following imaging exams were independently viewed and interpreted by me and discussed with patient:         Standardized Questionnaire:               Assessment and Plan:     Elevated prostate specific antigen (PSA)  We discussed the significance of an elevated PSA and the need for further testing to help us decide on Prostate  biopsy.  We also discussed about MRI and how it helps us to identify and target clinically significant lesions by MRI/TRUS fusion.  He has a  Significant family history of Prostate cancer.    In view of this We decided to do  MRI Prostate.    - Adult Urology Referral  - MR Prostate wo & w Contrast; Future      Plan:  MRI and will call with results    Orders  Orders Placed This Encounter   Procedures     MR Prostate wo & w Contrast       Michoacano Batista MD  [facility]OLOG[facility] [place]      ==========================    Additional Billing and Coding Information:  Review of external notes as documented above   Review of the result(s) of each unique test - PSA    Independent interpretation of a test performed by another physician/other qualified health care professional (not separately reported) - NA    Discussion of management or test interpretation with external physician/other qualified healthcare  professional/appropriate source - NA    Diagnosis or treatment significantly limited by social determinants of health - NA    15 minutes spent on the date of the encounter doing chart review, review of test results, interpretation of tests, patient visit and documentation     ==========================

## 2021-07-09 NOTE — NURSING NOTE
"Chief Complaint   Patient presents with     Consult     elevated PSA       Blood pressure 130/80, pulse 58, height 1.88 m (6' 2\"), weight 88.5 kg (195 lb). Body mass index is 25.04 kg/m .    Patient Active Problem List   Diagnosis     Hepatitis B immune     Melanoma (H)     Bronchospasm     BCC (basal cell carcinoma), face     LTBI (latent tuberculosis infection)     ACP (advance care planning)     Burning sensation of feet     AK (actinic keratosis)     Nummular eczema     History of nonmelanoma skin cancer     History of melanoma in situ     Elevated prostate specific antigen (PSA)     Elevated TSH       No Known Allergies    Current Outpatient Medications   Medication Sig Dispense Refill     albuterol (PROAIR HFA/PROVENTIL HFA/VENTOLIN HFA) 108 (90 Base) MCG/ACT inhaler Inhale 2 puffs into the lungs every 6 hours as needed for shortness of breath / dyspnea 8.5 g 1     ammonium lactate (AMLACTIN) 12 % external cream APPLY TOPICALLY 2 TIMES DAILY AS NEEDED FOR DRY SKIN 385 g 2     clobetasol (TEMOVATE) 0.05 % external ointment USE ON AFFECTED AREA UP TO TWICE A DAY FOR NO MORE THAN 2 WEEKS. AVOID FACE,NECK,GROIN AND UNDERARMS 90 g 3     Multiple Vitamins-Minerals (MULTIVITAMIN OR) Take 1 tablet by mouth daily       triamcinolone (KENALOG) 0.1 % external ointment Apply twice daily as needed 80 g 5     VITAMIN D, CHOLECALCIFEROL, PO Take 2,000 Units by mouth daily         Social History     Tobacco Use     Smoking status: Never Smoker     Smokeless tobacco: Never Used   Substance Use Topics     Alcohol use: Yes     Comment: socially, CAGE-neg     Drug use: No       JOANNE De La Paz  7/9/2021  4:08 PM  "

## 2021-07-22 NOTE — PROGRESS NOTES
Chief Complaint:    Elevated PSA (Prostate Specific Antigen)           Consult or Referral:     Mr. Juan Miguel Donovan is a 66 year old male seen at the request of Dr. Batista.         History of Present Illness:     Juan Miguel Donovan is a 66 year old male being seen for elevated PSA.  Duration of problem: 5 months  Previous treatments: none      Reviewed previous notes from Dr. Brent Gonzalez    Persisitent elavation of PSA  Family history of prostate cancer +ve  In father, however  Has no significant LUTS at the moment. No H/o recent instrumentation.             Past Medical History:     Past Medical History:   Diagnosis Date     Basal cell carcinoma      Cancer (H)     melanoma - L arm - 2006     Malignant melanoma (H)      Malignant melanoma nos             Past Surgical History:     Past Surgical History:   Procedure Laterality Date     BIOPSY OF SKIN LESION       GENITOURINARY SURGERY      vasectomy     MOHS MICROGRAPHIC PROCEDURE       ORTHOPEDIC SURGERY      flexor tendon surgery R hand - childhood            Medications     Current Outpatient Medications   Medication     albuterol (PROAIR HFA/PROVENTIL HFA/VENTOLIN HFA) 108 (90 Base) MCG/ACT inhaler     ammonium lactate (AMLACTIN) 12 % external cream     clobetasol (TEMOVATE) 0.05 % external ointment     Multiple Vitamins-Minerals (MULTIVITAMIN OR)     triamcinolone (KENALOG) 0.1 % external ointment     VITAMIN D, CHOLECALCIFEROL, PO     No current facility-administered medications for this visit.            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 Not Asked   Social History Narrative    Oct 2015    3 sons     - 2 orthodontists     - 1     All own their homes        Oct 2017    Traveling frequently for professional association - end May 2019    Place near [facility]        Oct 2019    Finished with leadership in professional assoc.     Less travel     Social Determinants of Health     Financial Resource Strain:      Difficulty of Paying Living Expenses:    Food Insecurity:      Worried About Running Out of Food in the Last Year:      Ran Out of Food in the Last Year:    Transportation Needs:      Lack of Transportation (Medical):      Lack of Transportation (Non-Medical):    Physical Activity:      Days of Exercise per Week:      Minutes of Exercise per Session:    Stress:      Feeling of Stress :    Social Connections:      Frequency of Communication with Friends and Family:      Frequency of Social Gatherings with Friends and Family:      Attends [employer] Services:      Active Member of Clubs or Organizations:      Attends Club or Organization Meetings:      Marital Status:    Intimate Partner Violence:      Fear of Current or Ex-Partner:      Emotionally Abused:      Physically Abused:      Sexually Abused:             Allergies:   Patient has no known allergies.         Review of Systems:  From intake questionnaire     Skin: negative  Eyes: negative  Ears/Nose/Throat: negative  Respiratory: No shortness of breath, dyspnea on exertion, cough, or  "hemoptysis  Cardiovascular: No chest pain or palpitations  Gastrointestinal: negative; no nausea/vomiting, constipation or diarrhea  Genitourinary: as per HPI  Musculoskeletal: negative  Neurologic: negative  Psychiatric: negative  Hematologic/Lymphatic/Immunologic: negative  Endocrine: negative         Physical Exam:     Patient is a 66 year old  male   Vitals: Blood pressure 130/80, pulse 58, height 1.88 m (6' 2\"), weight 88.5 kg (195 lb).  Constitutional: Body mass index is 25.04 kg/m .  Alert, no acute distress, oriented, conversant  Eyes: no scleral icterus; extraocular muscles intact, moist conjunctivae  Neck: trachea midline, no thyromegaly  Ears/nose/mouth: throat/mouth:normal, good dentition  Respiratory: no respiratory distress, or pursed lip breathing  Cardiovascular: pulses strong and intact; no obvious jugular venous distension present  Gastrointestinal: soft, nontender, no organomegaly or masses,   Lymphatics: No inguinal adenopathy  Musculoskeletal: extremities normal, no peripheral edema  Skin: no suspicious lesions or rashes  Neuro: Alert, oriented, speech and mentation normal  Psych: affect and mood normal, alert and oriented to person, place and time  Gait: Normal  : CHARLINE anodular, symmetric      Labs and Pathology:    The following labs were reviewed by me and discussed with the patient:    Significant for   Lab Results   Component Value Date    CR 1.1 10/11/2013     PSA   Date Value Ref Range Status   05/20/2021 5.78 (H) 0 - 4 ug/L Final     Comment:     Assay Method:  Chemiluminescence using Siemens Vista analyzer   02/22/2021 6.04 (H) 0 - 4 ug/L Final     Comment:     Assay Method:  Chemiluminescence using Siemens Vista analyzer   10/11/2019 3.07 0 - 4 ug/L Final     Comment:     Assay Method:  Chemiluminescence using Siemens Vista analyzer   10/06/2017 2.59 0 - 4 ug/L Final     Comment:     Assay Method:  Chemiluminescence using Siemens Vista analyzer   09/23/2016 2.50 0 - 4 ug/L Final     " Comment:     Assay Method:  Chemiluminescence using Siemens Vista analyzer   10/05/2015 2.10 0 - 4 ug/L Final   10/11/2013 1.91 0 - 4 ug/L Final     Comment:     PSA results are about 7% lower than our prior method due to a methodology   change   on August 30, 2011.   07/19/2011 1.60 0 - 4 ug/L Final   07/25/2006 1.22 0 - 4 ug/L Final             Imaging:    The following imaging exams were independently viewed and interpreted by me and discussed with patient:         Standardized Questionnaire:               Assessment and Plan:     Elevated prostate specific antigen (PSA)  We discussed the significance of an elevated PSA and the need for further testing to help us decide on Prostate  biopsy.  We also discussed about MRI and how it helps us to identify and target clinically significant lesions by MRI/TRUS fusion.  He has a  Significant family history of Prostate cancer.    In view of this We decided to do  MRI Prostate.    - Adult Urology Referral  - MR Prostate wo & w Contrast; Future      Plan:  MRI and will call with results    Orders  Orders Placed This Encounter   Procedures     MR Prostate wo & w Contrast       Michoacano Batista MD  [facility]OLOG[facility] [place]      ==========================    Additional Billing and Coding Information:  Review of external notes as documented above   Review of the result(s) of each unique test - PSA    Independent interpretation of a test performed by another physician/other qualified health care professional (not separately reported) - NA    Discussion of management or test interpretation with external physician/other qualified healthcare professional/appropriate source - NA    Diagnosis or treatment significantly limited by social determinants of health - NA    15 minutes spent on the date of the encounter doing chart review, review of test results, interpretation of tests, patient visit and documentation     ==========================

## 2021-08-03 ENCOUNTER — ANCILLARY PROCEDURE (OUTPATIENT)
Dept: MRI IMAGING | Facility: CLINIC | Age: 66
End: 2021-08-03
Attending: STUDENT IN AN ORGANIZED HEALTH CARE EDUCATION/TRAINING PROGRAM
Payer: COMMERCIAL

## 2021-08-03 DIAGNOSIS — R97.20 ELEVATED PROSTATE SPECIFIC ANTIGEN (PSA): ICD-10-CM

## 2021-08-03 PROCEDURE — 72197 MRI PELVIS W/O & W/DYE: CPT | Performed by: RADIOLOGY

## 2021-08-03 PROCEDURE — A9585 GADOBUTROL INJECTION: HCPCS | Performed by: RADIOLOGY

## 2021-08-03 RX ORDER — GADOBUTROL 604.72 MG/ML
10 INJECTION INTRAVENOUS ONCE
Status: COMPLETED | OUTPATIENT
Start: 2021-08-03 | End: 2021-08-03

## 2021-08-03 RX ADMIN — GADOBUTROL 9 ML: 604.72 INJECTION INTRAVENOUS at 18:25

## 2021-08-03 NOTE — DISCHARGE INSTRUCTIONS
MRI Contrast Discharge Instructions    The IV contrast you received today will pass out of your body in your  urine. This will happen in the next 24 hours. You will not feel this process.  Your urine will not change color.    Drink at least 4 extra glasses of water or juice today (unless your doctor  has restricted your fluids). This reduces the stress on your kidneys.  You may take your regular medicines.    If you are on dialysis: It is best to have dialysis today.    If you have a reaction: Most reactions happen right away. If you have  any new symptoms after leaving the hospital (such as hives or swelling),  call your hospital at the correct number below. Or call your family doctor.  If you have breathing distress or wheezing, call 911.    Special instructions: ***    I have read and understand the above information.    Signature:______________________________________ Date:___________    Staff:__________________________________________ Date:___________     Time:__________    [place] [facility] Departments:    ___[place]: [phone]  ___[facility]: [phone]  ___[place]: [phone] ___[facility]: [phone]  ___[facility]: [phone]  ___[facility]: [phone]  ___[phone]  ___[facility]: [phone]  ___[phone]

## 2021-08-11 DIAGNOSIS — R97.20 ELEVATED PROSTATE SPECIFIC ANTIGEN (PSA): Primary | ICD-10-CM

## 2021-08-11 RX ORDER — CIPROFLOXACIN 500 MG/1
500 TABLET, FILM COATED ORAL 2 TIMES DAILY
Qty: 6 TABLET | Refills: 0 | Status: SHIPPED | OUTPATIENT
Start: 2021-08-11 | End: 2022-05-03

## 2021-09-25 ENCOUNTER — HEALTH MAINTENANCE LETTER (OUTPATIENT)
Age: 66
End: 2021-09-25

## 2021-10-08 ENCOUNTER — OFFICE VISIT (OUTPATIENT)
Dept: UROLOGY | Facility: CLINIC | Age: 66
End: 2021-10-08
Payer: COMMERCIAL

## 2021-10-08 VITALS
DIASTOLIC BLOOD PRESSURE: 68 MMHG | HEART RATE: 56 BPM | OXYGEN SATURATION: 98 % | HEIGHT: 73 IN | SYSTOLIC BLOOD PRESSURE: 124 MMHG | WEIGHT: 195 LBS | BODY MASS INDEX: 25.84 KG/M2

## 2021-10-08 DIAGNOSIS — Z79.2 PROPHYLACTIC ANTIBIOTIC: ICD-10-CM

## 2021-10-08 DIAGNOSIS — R97.20 ELEVATED PROSTATE SPECIFIC ANTIGEN (PSA): Primary | ICD-10-CM

## 2021-10-08 PROCEDURE — 76942 ECHO GUIDE FOR BIOPSY: CPT | Performed by: STUDENT IN AN ORGANIZED HEALTH CARE EDUCATION/TRAINING PROGRAM

## 2021-10-08 PROCEDURE — 55700 PR BIOPSY OF PROSTATE,NEEDLE/PUNCH: CPT | Performed by: STUDENT IN AN ORGANIZED HEALTH CARE EDUCATION/TRAINING PROGRAM

## 2021-10-08 PROCEDURE — 88305 TISSUE EXAM BY PATHOLOGIST: CPT | Performed by: PATHOLOGY

## 2021-10-08 PROCEDURE — 96372 THER/PROPH/DIAG INJ SC/IM: CPT | Mod: 59 | Performed by: STUDENT IN AN ORGANIZED HEALTH CARE EDUCATION/TRAINING PROGRAM

## 2021-10-08 RX ORDER — GENTAMICIN 40 MG/ML
80 INJECTION, SOLUTION INTRAMUSCULAR; INTRAVENOUS ONCE
Status: COMPLETED | OUTPATIENT
Start: 2021-10-08 | End: 2021-10-08

## 2021-10-08 RX ORDER — LIDOCAINE HYDROCHLORIDE 10 MG/ML
20 INJECTION, SOLUTION EPIDURAL; INFILTRATION; INTRACAUDAL; PERINEURAL ONCE
Status: COMPLETED | OUTPATIENT
Start: 2021-10-08 | End: 2021-10-08

## 2021-10-08 RX ADMIN — LIDOCAINE HYDROCHLORIDE 20 ML: 10 INJECTION, SOLUTION EPIDURAL; INFILTRATION; INTRACAUDAL; PERINEURAL at 10:00

## 2021-10-08 RX ADMIN — GENTAMICIN 80 MG: 40 INJECTION, SOLUTION INTRAMUSCULAR; INTRAVENOUS at 09:30

## 2021-10-08 ASSESSMENT — PAIN SCALES - GENERAL: PAINLEVEL: NO PAIN (0)

## 2021-10-08 ASSESSMENT — MIFFLIN-ST. JEOR: SCORE: 1723.39

## 2021-10-08 NOTE — NURSING NOTE
"Chief Complaint   Patient presents with     Elevated PSA     Patient here today for Transrectal Ultrasound Prostate Biopsy       Blood pressure 110/70, pulse 59, height 1.854 m (6' 1\"), weight 88.5 kg (195 lb), SpO2 100 %. Body mass index is 25.73 kg/m .    Patient Active Problem List   Diagnosis     Hepatitis B immune     Melanoma (H)     Bronchospasm     BCC (basal cell carcinoma), face     LTBI (latent tuberculosis infection)     ACP (advance care planning)     Burning sensation of feet     AK (actinic keratosis)     Nummular eczema     History of nonmelanoma skin cancer     History of melanoma in situ     Elevated prostate specific antigen (PSA)     Elevated TSH       No Known Allergies    Current Outpatient Medications   Medication Sig Dispense Refill     albuterol (PROAIR HFA/PROVENTIL HFA/VENTOLIN HFA) 108 (90 Base) MCG/ACT inhaler Inhale 2 puffs into the lungs every 6 hours as needed for shortness of breath / dyspnea 8.5 g 1     ammonium lactate (AMLACTIN) 12 % external cream APPLY TOPICALLY 2 TIMES DAILY AS NEEDED FOR DRY SKIN 385 g 2     ciprofloxacin (CIPRO) 500 MG tablet Take 1 tablet (500 mg) by mouth 2 times daily START DAY BEFORE PROCEDURE 6 tablet 0     clobetasol (TEMOVATE) 0.05 % external ointment USE ON AFFECTED AREA UP TO TWICE A DAY FOR NO MORE THAN 2 WEEKS. AVOID FACE,NECK,GROIN AND UNDERARMS 90 g 3     triamcinolone (KENALOG) 0.1 % external ointment Apply twice daily as needed 80 g 5     Multiple Vitamins-Minerals (MULTIVITAMIN OR) Take 1 tablet by mouth daily (Patient not taking: Reported on 10/8/2021)       VITAMIN D, CHOLECALCIFEROL, PO Take 2,000 Units by mouth daily (Patient not taking: Reported on 10/8/2021)         Social History     Tobacco Use     Smoking status: Never Smoker     Smokeless tobacco: Never Used   Substance Use Topics     Alcohol use: Yes     Comment: socially, CAGE-neg     Drug use: No         Procedure was explained to patient prior to performing said procedure. The " patient signed the consent form and all questions were answered prior to the procedure. Any pre-procedural antibiotics were given according to the performing physicians recommendation. Pt's information was confirmed on samples and samples were sent for analysis. Juan José reviewed information on labels sent with patient and confirmed the accuracy of all the labels.    Consent read and signed: Yes   No Known Allergies  Performing Physician: Dr. Zapien  Antibiotic taken?  YES  Aspirin or other blood thinning medications discontinued 7-10 days:  YES  Time of Fleet's enema:  YES 7:30AM  Patient given Gentamicin intramuscular injection 30 minutes prior to procedure Yes    12 samples were taken from the right and left, medial and lateral base, mid, and apex of the prostate respectively.  additional samples were taken. Vitals were repeated prior to patient leaving and instructions for post TRUS care were explained to the pt.           SORIN Wilburn  10/8/2021  9:12 AM

## 2021-10-08 NOTE — PROGRESS NOTES
PREPROCEDURE DIAGNOSIS: Elevated PSA    POSTPROCEDURE DIAGNOSIS: Elevated PSA.     PROCEDURE: Transrectal ultrasound sizing and transrectal ultrasound guided prostatic needle biopsy for Juan Miguel Donovan who is a 66 year old male.     SURGEON: Michoacano Batista  ANESTHESIA: 5 mL of 1% periprostatic block bilaterally     DESCRIPTION OF PROCEDURE: The procedure, the outcome, the anesthesia, and the risks were discussed with the patient. Informed consent was obtained and signed and a timeout was completed prior to the procedure. Digital rectal examination was performed with the below findings noted. Anesthesia was administered as noted above and the transrectal ultrasound probe was inserted, sizing was performed, and the below findings were noted. 12 core biopsies were taken as described below. The probe was then withdrawn. Patient tolerated the procedure well.     FINDINGS: Digital rectal exam reveals  normal prostate. Total volume is 39 mL. Hypoechoic lesions noted bilaterally. US images were obtained.  We then performed site-directed sextant biopsies.  12 cores were taken with 6 on each side, base, mid and apex. Additional cores were taken from the Transitional zone of the prostate on either side    PLAN: Will follow-up next week to review results.Complete Antibiotics as advised.    Michoacano Batista MD  Urology  [facility]

## 2021-10-08 NOTE — LETTER
10/8/2021       RE: Juan Miguel Donovan  [address]  [place] [address]     Dear Colleague,    Thank you for referring your patient, Juan Miguel Donovan, to the [facility] [place] at [facility]. Please see a copy of my visit note below.    PREPROCEDURE DIAGNOSIS: Elevated PSA    POSTPROCEDURE DIAGNOSIS: Elevated PSA.     PROCEDURE: Transrectal ultrasound sizing and transrectal ultrasound guided prostatic needle biopsy for Juan Miguel Donovan who is a 66 year old male.     SURGEON: Michoacano Batista  ANESTHESIA: 5 mL of 1% periprostatic block bilaterally     DESCRIPTION OF PROCEDURE: The procedure, the outcome, the anesthesia, and the risks were discussed with the patient. Informed consent was obtained and signed and a timeout was completed prior to the procedure. Digital rectal examination was performed with the below findings noted. Anesthesia was administered as noted above and the transrectal ultrasound probe was inserted, sizing was performed, and the below findings were noted. 12 core biopsies were taken as described below. The probe was then withdrawn. Patient tolerated the procedure well.     FINDINGS: Digital rectal exam reveals  normal prostate. Total volume is 39 mL. Hypoechoic lesions noted bilaterally. US images were obtained.  We then performed site-directed sextant biopsies.  12 cores were taken with 6 on each side, base, mid and apex. Additional cores were taken from the Transitional zone of the prostate on either side    PLAN: Will follow-up next week to review results.Complete Antibiotics as advised.    Michoacano Batista MD  Urology  [facility]

## 2021-10-08 NOTE — PATIENT INSTRUCTIONS
[facility], [facility]  Transrectal Ultrasound  Post Operative Information    The physician who performed your Transrectal Ultrasound is   (telephone number [phone]).  Please contact this doctor if you have any problems or questions.  If unable to reach your doctor, please return to the Emergency Department.      Take one antibiotic the evening of the procedure and then as directed on your prescription.    Drink at least 6-8 glasses of fluids for the first 48 hours.    Avoid heavy lifting and strenuous activity for 48 hours.    Avoid sexual intercourse for the first 24 hours.    No aspirin or ibuprofen products (Motrin, Advil, Nuprin, ect.) for one week.  You may take acetaminophen (Tylenol) for pain.    You may notice a small amount of blood on the tissue after a bowel movement.    You may pass blood with clots in your urine following the procedure.  The amount will decrease with time but may be visible for up to two weeks.     You make have blood in your semen for 4 weeks after the procedure.    You may experience mild perineal (groin area) discomfort after the procedure.    Please call you doctor if you have any of the follow symptoms:  Fever  Increase in the amount of blood passed  Severe discomfort or pain

## 2021-10-12 LAB
PATH REPORT.COMMENTS IMP SPEC: NORMAL
PATH REPORT.COMMENTS IMP SPEC: NORMAL
PATH REPORT.FINAL DX SPEC: NORMAL
PATH REPORT.GROSS SPEC: NORMAL
PATH REPORT.MICROSCOPIC SPEC OTHER STN: NORMAL
PATH REPORT.RELEVANT HX SPEC: NORMAL
PHOTO IMAGE: NORMAL

## 2021-10-18 ENCOUNTER — VIRTUAL VISIT (OUTPATIENT)
Dept: UROLOGY | Facility: CLINIC | Age: 66
End: 2021-10-18
Payer: COMMERCIAL

## 2021-10-18 DIAGNOSIS — R97.20 ELEVATED PROSTATE SPECIFIC ANTIGEN (PSA): Primary | ICD-10-CM

## 2021-10-18 PROCEDURE — 99213 OFFICE O/P EST LOW 20 MIN: CPT | Mod: 95 | Performed by: STUDENT IN AN ORGANIZED HEALTH CARE EDUCATION/TRAINING PROGRAM

## 2021-10-18 NOTE — PROGRESS NOTES
Juan Miguel is a 66 year old who is being evaluated via a billable video visit.      Text link to [phone]  How would you like to obtain your AVS? MyChart  If the video visit is dropped, the invitation should be resent by: Text to cell phone: [phone]  Will anyone else be joining your video visit? No    Shanda Mojica CMA    Video Start Time: 4:32  Video-Visit Details    Type of service:  Video Visit    Video End Time:4:42 PM    Originating Location (pt. Location): Other office    Distant Location (provider location):  [facility] [place]     Platform used for Video Visit: [employer]     HPI:  Juan Miguel Donovan is a 66 year old male being seen for follow up after prostate biopsy.  Duration of problem: few weeks  Previous treatments: none    Juan Miguel is here to follow up on the prostate biopsy he had  Just over a week ago.  He currently does not have any significant issues and has been doing well overall.         Review of Systems:  From intake questionnaire     Skin: negative  Eyes: negative  Ears/Nose/Throat: negative  Respiratory: No shortness of breath, dyspnea on exertion, cough, or hemoptysis  Cardiovascular: No chest pain or palpitations  Gastrointestinal: negative; no nausea/vomiting, constipation or diarrhea  Genitourinary: as per HPI  Musculoskeletal: negative  Neurologic: negative  Psychiatric: negative  Hematologic/Lymphatic/Immunologic: negative  Endocrine: negative         Physical Exam:   This is a virtual visit    Alert, no acute distress, oriented, conversant    Ears/nose/mouth: mouth:normal, good dentition  Respiratory: no respiratory distress, or pursed lip breathing  Cardiovascular:no obvious jugular venous distension present  Skin: no suspicious lesions or rashes on Visible body parts on the Screen  Neuro: Alert, oriented, speech and mentation normal  Psych: affect and mood normal, alert and oriented to person, place and time  Review of Imaging:  The following imaging  exams were independently viewed and interpreted by me and discussed with patient:  MRI Abd/Pelvis: Normal    Review of Labs:  The following labs were reviewed by me and discussed with the patient:  Surgical Pathology:   One TZ core with evidence of acute and chronic inflammation otherwise negative for malignancy    Assessment & Plan     Elevated prostate specific antigen (PSA)  Discussed the biopsy results today with Juan Miguel which were encouraging  I discussed with him that we will see him in a years time with a repeat PSA and consider biopsy only if there is some evidence for that in the MRI.          Michoacano Batista MD  [facility] [place]      ==========================    Additional Billing and Coding Information:  Review of external notes as documented above   Review of the result(s) of each unique test - MRI    Independent interpretation of a test performed by another physician/other qualified health care professional (not separately reported) - Surgical PAthology    Discussion of management or test interpretation with external physician/other qualified healthcare professional/appropriate source -       Diagnosis or treatment significantly limited by social determinants of health -       10 minutes spent on the date of the encounter doing chart review, review of test results, interpretation of tests, patient visit and documentation

## 2021-10-18 NOTE — LETTER
10/18/2021       RE: Juan Miguel Donovan  [address]  [place] [address]     Dear Colleague,    Thank you for referring your patient, Juan Miguel Donovan, to the [facility]OLOG[facility] [place] at [facility]. Please see a copy of my visit note below.    Juan Miguel is a 66 year old who is being evaluated via a billable video visit.      Text link to [phone]  How would you like to obtain your AVS? MyChart  If the video visit is dropped, the invitation should be resent by: Text to cell phone: [phone]  Will anyone else be joining your video visit? No    Shanda Mojica CMA    Video Start Time: 4:32  Video-Visit Details    Type of service:  Video Visit    Video End Time:4:42 PM    Originating Location (pt. Location): Other office    Distant Location (provider location):  [facility] [place]     Platform used for Video Visit: [employer]     HPI:  Juan Miguel Donovan is a 66 year old male being seen for follow up after prostate biopsy.  Duration of problem: few weeks  Previous treatments: none    Jaun Miguel is here to follow up on the prostate biopsy he had  Just over a week ago.  He currently does not have any significant issues and has been doing well overall.         Review of Systems:  From intake questionnaire     Skin: negative  Eyes: negative  Ears/Nose/Throat: negative  Respiratory: No shortness of breath, dyspnea on exertion, cough, or hemoptysis  Cardiovascular: No chest pain or palpitations  Gastrointestinal: negative; no nausea/vomiting, constipation or diarrhea  Genitourinary: as per HPI  Musculoskeletal: negative  Neurologic: negative  Psychiatric: negative  Hematologic/Lymphatic/Immunologic: negative  Endocrine: negative         Physical Exam:   This is a virtual visit    Alert, no acute distress, oriented, conversant    Ears/nose/mouth: mouth:normal, good dentition  Respiratory: no respiratory distress, or pursed lip  breathing  Cardiovascular:no obvious jugular venous distension present  Skin: no suspicious lesions or rashes on Visible body parts on the Screen  Neuro: Alert, oriented, speech and mentation normal  Psych: affect and mood normal, alert and oriented to person, place and time  Review of Imaging:  The following imaging exams were independently viewed and interpreted by me and discussed with patient:  MRI Abd/Pelvis: Normal    Review of Labs:  The following labs were reviewed by me and discussed with the patient:  Surgical Pathology:   One TZ core with evidence of acute and chronic inflammation otherwise negative for malignancy    Assessment & Plan     Elevated prostate specific antigen (PSA)  Discussed the biopsy results today with Juan Miguel which were encouraging  I discussed with him that we will see him in a years time with a repeat PSA and consider biopsy only if there is some evidence for that in the MRI.    Michoacano Batista MD  [facility] [place]      ==========================    Additional Billing and Coding Information:  Review of external notes as documented above   Review of the result(s) of each unique test - MRI    Independent interpretation of a test performed by another physician/other qualified health care professional (not separately reported) - Surgical PAthology    Discussion of management or test interpretation with external physician/other qualified healthcare professional/appropriate source -     Diagnosis or treatment significantly limited by social determinants of health -     10 minutes spent on the date of the encounter doing chart review, review of test results, interpretation of tests, patient visit and documentation

## 2022-05-04 ENCOUNTER — DOCUMENTATION ONLY (OUTPATIENT)
Dept: FAMILY MEDICINE | Facility: CLINIC | Age: 67
End: 2022-05-04

## 2022-05-04 ENCOUNTER — OFFICE VISIT (OUTPATIENT)
Dept: FAMILY MEDICINE | Facility: CLINIC | Age: 67
End: 2022-05-04
Payer: COMMERCIAL

## 2022-05-04 VITALS
BODY MASS INDEX: 26.11 KG/M2 | RESPIRATION RATE: 16 BRPM | HEIGHT: 73 IN | OXYGEN SATURATION: 99 % | DIASTOLIC BLOOD PRESSURE: 75 MMHG | SYSTOLIC BLOOD PRESSURE: 115 MMHG | HEART RATE: 58 BPM | TEMPERATURE: 98.1 F | WEIGHT: 197 LBS

## 2022-05-04 DIAGNOSIS — R20.2 PARESTHESIA OF SKIN: ICD-10-CM

## 2022-05-04 DIAGNOSIS — Z00.00 HEALTHCARE MAINTENANCE: Primary | ICD-10-CM

## 2022-05-04 DIAGNOSIS — Z12.11 SPECIAL SCREENING FOR MALIGNANT NEOPLASMS, COLON: ICD-10-CM

## 2022-05-04 DIAGNOSIS — R79.9 ABNORMAL FINDING OF BLOOD CHEMISTRY, UNSPECIFIED: ICD-10-CM

## 2022-05-04 DIAGNOSIS — Z23 ENCOUNTER FOR IMMUNIZATION: ICD-10-CM

## 2022-05-04 DIAGNOSIS — Z00.00 MEDICARE ANNUAL WELLNESS VISIT, INITIAL: ICD-10-CM

## 2022-05-04 DIAGNOSIS — R97.20 ELEVATED PROSTATE SPECIFIC ANTIGEN (PSA): ICD-10-CM

## 2022-05-04 DIAGNOSIS — C43.62 MALIGNANT MELANOMA OF LEFT UPPER EXTREMITY INCLUDING SHOULDER (H): ICD-10-CM

## 2022-05-04 DIAGNOSIS — R79.89 ELEVATED TSH: ICD-10-CM

## 2022-05-04 DIAGNOSIS — E78.5 HYPERLIPIDEMIA LDL GOAL <130: ICD-10-CM

## 2022-05-04 DIAGNOSIS — Z12.5 ENCOUNTER FOR SCREENING FOR MALIGNANT NEOPLASM OF PROSTATE: ICD-10-CM

## 2022-05-04 LAB
CHOLEST SERPL-MCNC: 227 MG/DL
FASTING STATUS PATIENT QL REPORTED: ABNORMAL
HBA1C MFR BLD: 5.8 % (ref 0–5.6)
HDLC SERPL-MCNC: 43 MG/DL
LDLC SERPL CALC-MCNC: 151 MG/DL
NONHDLC SERPL-MCNC: 184 MG/DL
PSA SERPL-MCNC: 5.75 UG/L (ref 0–4)
TRIGL SERPL-MCNC: 166 MG/DL
TSH SERPL DL<=0.005 MIU/L-ACNC: 3.38 MU/L (ref 0.4–4)

## 2022-05-04 PROCEDURE — 90732 PPSV23 VACC 2 YRS+ SUBQ/IM: CPT | Performed by: FAMILY MEDICINE

## 2022-05-04 PROCEDURE — 83036 HEMOGLOBIN GLYCOSYLATED A1C: CPT | Performed by: FAMILY MEDICINE

## 2022-05-04 PROCEDURE — 99397 PER PM REEVAL EST PAT 65+ YR: CPT | Mod: 25 | Performed by: FAMILY MEDICINE

## 2022-05-04 PROCEDURE — 90471 IMMUNIZATION ADMIN: CPT | Performed by: FAMILY MEDICINE

## 2022-05-04 PROCEDURE — G0103 PSA SCREENING: HCPCS | Performed by: FAMILY MEDICINE

## 2022-05-04 PROCEDURE — 80061 LIPID PANEL: CPT | Performed by: FAMILY MEDICINE

## 2022-05-04 PROCEDURE — 84443 ASSAY THYROID STIM HORMONE: CPT | Performed by: FAMILY MEDICINE

## 2022-05-04 PROCEDURE — 36415 COLL VENOUS BLD VENIPUNCTURE: CPT | Performed by: FAMILY MEDICINE

## 2022-05-04 NOTE — PATIENT INSTRUCTIONS
Cholesterol and statin  Here the link to a shared decision making tool at [place]   Statin Choice Decision AID - Site ([email or web address])           Scheduling:  If you had an XRay/CT/Ultrasound/MRI ordered the number is [phone] to schedule or change your radiology appointment.   [facility] MEDICARE PERSONAL PREVENTIVE SERVICES PLAN - SERVICES  For Men          Review these tests with your doctor then decide which ones you want and take this page home for your reference          Immunization History   Administered Date(s) Administered    COVID-19,PF,Pfizer (12+ Yrs) 01/05/2021, 01/25/2021, 10/19/2021    COVID-19,PF,Pfizer 12+ Yrs (2022 and After) 04/24/2022    Flu, Unspecified 09/10/2020, 09/14/2021    HEPA 09/20/2007    HepB 12/01/1989, 01/01/1990, 06/01/1990    Influenza Not Indicated - By Hx 10/11/2017    Influenza Vaccine IM > 6 months Valent IIV4 (Alfuria,Fluzone) 11/19/2018, 11/17/2019, 09/14/2021    Meningococcal (Menactra ) 09/20/2007    Pneumo Conj 13-V (2010&after) 10/11/2019    TDAP Vaccine (Boostrix) 10/06/2017    Tdap (Adacel,Boostrix) 09/20/2007    Yellow Fever 09/20/2007    Zoster vaccine recombinant adjuvanted (SHINGRIX) 11/13/2019                                                                                             IMMUNIZATIONS Description Recommend today?      Influenza (flu shot) Helps to prevent flu; you should get it every year No; is up to date.   PCV 13 Prevents pneumonia; given once No; is up to date.   PPSV 23 Prevents pneumonia; given once Offer   Tetanus Prevents tetanus; need every 10 years No; is up to date.   Herpes Zoster (shingles) Prevents or lessens symptoms from shingles; given once.  Up to date  If you want this vaccine please go to a pharmacy to receive Shinrix   Hepatitis B  If you have any of the following risk factors you should be immunized for hepatitis B: severe kidney disease, people who live in the same house as a carrier of Hepatitis B virus, people who live in   institutions (e.g. nursing homes or group homes), homosexual men, patients with hemophilia who received Factor VIII or IX concentrates, abusers of illicit injectable drugs No; is up to date.           Health Maintenance   Topic Date Due    DTAP/TDAP/TD IMMUNIZATION (1 - Tdap) 10/06/2017    ZOSTER IMMUNIZATION (2 of 2) 01/08/2020    FALL RISK ASSESSMENT  Never done    Pneumococcal Vaccine: 65+ Years (2 - PPSV23 or PCV20) 07/13/2021    AORTIC ANEURYSM SCREENING (SYSTEM ASSIGNED)  Never done    COLORECTAL CANCER SCREENING  09/08/2021    ADVANCE CARE PLANNING  12/22/2021    PHQ-2 (once per calendar year)  01/01/2022    MEDICARE ANNUAL WELLNESS VISIT  02/22/2022    LIPID  02/22/2026    HEPATITIS C SCREENING  Completed    HIV SCREENING  Completed    INFLUENZA VACCINE  Completed    COVID-19 Vaccine  Completed    Pneumococcal Vaccine: Pediatrics (0 to 5 Years) and At-Risk Patients (6 to 64 Years)  Aged Out    IPV IMMUNIZATION  Aged Out    MENINGITIS IMMUNIZATION  Aged Out    HEPATITIS B IMMUNIZATION  Aged Out            SCREENING TESTS       Description    Year of Last Screening    Recommended Today?   Heart disease screening blood tests  Cholesterol level Reducing cholesterol can reduce your risk of heart attacks by 25%.  Screening is recommended yearly if you are at risk of heart disease otherwise every 4-5 years   Yes; Recommended and ordered.      Diabetes screening tests  Hemoglobin A1c blood test    Finding and treating diabetes early can reduce complications.  Screening recommended/covered yearly if you have high blood pressure, high cholesterol, obesity (BMI >30), or a history of high blood glucose tests; or 2 of the following: family history of diabetes, overweight (BMI >25 but <30), age 65 years or older, and a history of diabetes of pregnancy or gave birth to baby weighing more than 9 lbs.      Yes; Recommended and ordered.   Hepatitis B screening Finding hepatitis B early can reduce complications.  Screening is  recommended for persons with selected risk factors.   No: is not indicated today.   Hepatitis C screening Finding hepatitis C early can reduce complications.  Screening is recommended for all persons born from 1945 through 1965 and for those with selected other risk factors.    No; is up to date.   HIV screening Finding HIV early can reduce complications.  Screening is recommended for persons with risk factors for HIV infection.   No; is up to date.   Glaucoma screening Early detection of glaucoma can prevent blindness.   Please talk to your eye doctor about this.         SCREENING TESTS       Description    Year of Last Screening    Recommended Today?   Colorectal cancer screening  Fecal occult blood test   Screening colonoscopy Screening for colon cancer has been shown to reduce death from colon cancer by 25-30%. Screening recommended to start at 50 years and continuing until age 75 years.     DISCUSS  Yes - colonoscopy   Screening for Lung Cancer   Low-dose CT scanning Screening can reduce mortality in persons aged 55-80 who have smoked at least 30 pack-years and who are either still smoking or have quit in the past 15 years.   DISCUSS  Never smoker   Abdominal Aortic Aneurysm (AAA) screening  Ultrasound (US)    An aneurysm treated before rupture is very safe -a ruptured aneurysm can be fatal.  Screening  by US for AAA is limited to patients who meet one of the following criteria:  Men who are 65-75 years old and have smoked more than 100 cigarettes in their lifetime  Anyone with a family history of abdominal aortic aneurysm   DISCUSS  Never smoker         MEDICARE WELLNESS EXAM PATIENT INSTRUCTIONS     Yearly exam:   See your health care provider every year in order to review changes in your health, review medicines that you take, and discuss preventive care needs such as immunizations and cancer screening.  Get a flu shot each year.      Advance Directive:  If you have not done so, you are encouraged to  complete an advance directive. If you would like support with this, please contact the clinic  through the main clinic line. More information about advance directives can be found at:   [email or web address]     Nutrition:   Eat at least 5 servings of fruits and vegetables each day.   Eat whole-grain bread, whole-wheat pasta and brown rice instead of white grains and rice.   Talk to your doctor about Calcium and Vitamin D.      Lifestyle:  Exercise for at least 150 minutes a week (30 minutes a day, 5 days a week). This will help you control your weight and prevent disease.   Limit alcohol to one drink per day.   If you smoke, try to quit - your doctor will be happy to help.   Wear sunscreen to prevent skin cancer.   See your dentist every six months for an exam and cleaning.   See your eye doctor every 1 to 2 years to screen for conditions such as glaucoma, macular degeneration and cataracts.

## 2022-05-04 NOTE — PROGRESS NOTES
>66 year old Wellness Visit ( Medicare etc)         HPI       This 66 year old male presents as an established patient  Brent Gonzalez who presents for a  Annual Wellness Exam.    Other issues patient wants to address today:      1. Needs colonoscopy - last 2011 Sept     2. Lipids    ASCVD risk 12.9% - Intermediate risk  Consider statin      3. Prostate  Had biopsy - no cancer  Monitor    4. Sabbitical  Plans to continue working for more years      Visual Acuity: :  Testing not done; patient has seen eye doctor in the past 12 months.    Patient Active Problem List   Diagnosis     Hepatitis B immune     Melanoma (H)     Bronchospasm     BCC (basal cell carcinoma), face     LTBI (latent tuberculosis infection)     ACP (advance care planning)     Burning sensation of feet     AK (actinic keratosis)     Nummular eczema     History of nonmelanoma skin cancer     History of melanoma in situ     Elevated prostate specific antigen (PSA)     Elevated TSH       Past Medical History:   Diagnosis Date     Basal cell carcinoma      Cancer (H)     melanoma - L arm - 2006     Malignant melanoma (H)      Malignant melanoma nos         Family History   Problem Relation Age of Onset     Prostate Cancer Father      Musculoskeletal Disorder Father      Asthma Son      Allergies Son      Asthma Son      Allergies Son      Asthma Son      Peripheral Neuropathy Mother      Skin Cancer No family hx of          Problem List, Family History and past Medical History reviewed and unchanged/updated.       Health risk Assessment/ Review of Systems:         Constitutional:   Fevers or night sweats?  NO      Eyes:   Vision problems?  NO            Hearing:  Do you feel you have hearing loss?  NO  Cardiovascular:  Chest pain, palpitations, or pain with walking?  NO        Respiratory:   Breathing problems or cough?  NO    :   Difficulty controlling urination?  NO        Musculoskeletal:   Stiffness or sore joints?  NO            Skin:    concerning lesions or moles?  NO           Nervous System:   loss of strength or sensation,  numbness or tingling,  tremor,  dizziness,  headache?  Positive-numbness/tingling in both feet chronic         Mental Health:   depression or anxiety,  sleep problems?  NO   Cognition:  Memory problems?  NO       Weight Loss: Have you lost 10 or more pounds unintentionally in the previous year?  NO  Energy: How much of the time during the past 3 weeks did you feel tired?   (check one of the following):   ?  All of the time  ? Most of the time  ? Some of the time  ? A little of the time  X None of the Time    PHQ-2 Score:   PHQ-2 ( 1999 Pfizer) 5/4/2022 10/8/2021   Q1: Little interest or pleasure in doing things 0 0   Q2: Feeling down, depressed or hopeless 0 0   PHQ-2 Score 0 0   PHQ-2 Total Score (12-17 Years)- Positive if 3 or more points; Administer PHQ-A if positive - 0       PHQ-9 Score:   No flowsheet data found.  Medical Care:     What other specialists or organizations are involved in your medical care?  Urology and Dermatology within  [facility], [place]   Patient Care Team       Relationship Specialty Notifications Start End    Brent Gonzalez MD PCP - General Family Practice  1/15/16     Phone: [phone] Fax: [phone]         [address] [address]    Ching Beckham MD MD Dermatology  11/10/17     Phone: [phone] Fax: [phone]         [address] [place] [address]    tOto Lazcano MD MD Dermapathology  1/25/18     Phone: [phone] Fax: [phone]         [address] [address]    Brent Gonzalez MD Assigned PCP   3/22/21     Phone: [phone] Fax: [phone]         [address] [place] [address]    Michoacano Batista MD Assigned Surgical Provider   10/10/21     Phone: [phone] Pager: [phone] Fax: [phone]        [address] [place] [address]          Do you have an advanced  directive? Yes      Social History / Home Safety     Social History     Tobacco Use     Smoking status: Never Smoker     Smokeless tobacco: Never Used   Substance Use Topics     Alcohol use: Yes     Comment: socially, CAGE-neg     Marital Status:  Who lives in your household? wife  Does your home have any of the following safety concerns? Loose rugs in the hallway, no grab bars in the bathroom, no handrails on the stairs or have poorly lit areas? NO  Do you feel threatened or controlled by a partner, ex-partner or anyone in your life? {Yes No   Has anyone hurt you physically, for example by pushing, hitting, slapping or kicking you   or forcing you to have sex? No       Functional Status     Do you need help with dressing yourself, bathing, or walking?No   Do you need help with the phone, transportation, shopping, preparing meals, housework, laundry, medications or managing money?No   By yourself and not using aids, do you have any difficulty walking up 10 steps  without resting? No   By yourself and not using aids, do you have any difficulty walking several hundred yards? No              Risk Behaviors and Healthy Habits     History   Smoking Status     Never Smoker   Smokeless Tobacco     Never Used     How many servings of fruits and vegetables do you eat a day? 2-5 per day  Exercise:walking  and biking Daily between 30-60 minutes  Do you frequently drive without a seatbelt? No   History   Smoking Status     Never Smoker   Smokeless Tobacco     Never Used     Do you use any other drugs? No       Do you use alcohol?Yes, 2 per week      Functional Ability   Was the patient's timed Up & Go test (Get up from chair walk, 10 feet turn, return to chair and sit down) unsteady or longer than 10 seconds? No     Fall risk:     1. Have you fallen two or more times in the past year? No   2. Have you fallen and had an injury in the past year?  No         Evaulation of Cognitive Function     Family member/caregiver  "input: Normal    1) Repeat 3 items (Leader, Season, Table)    2) Clock draw: NORMAL  3) 3 item recall: Recalls 3 objects  Results: 3 items recalled: COGNITIVE IMPAIRMENT LESS LIKELY    Mini-CogTM Copyright S Shelbie. Licensed by the author for use in [facility]; reprinted with permission ([email or web address]). All rights reserved.            Other Assessments:     CV Risk based on Pooled Cohort Risk (consider assessing every 4-6 years; consider statin in patients with 10-yr risk > 7.5%):   The 10-year ASCVD risk score (Holly Springsmarysol KAISER Jr., et al., 2013) is: 12.6%    Values used to calculate the score:      Age: 66 years      Sex: Male      Is Non- : No      Diabetic: No      Tobacco smoker: No      Systolic Blood Pressure: 115 mmHg      Is BP treated: No      HDL Cholesterol: 43 mg/dL      Total Cholesterol: 224.7 mg/dL    Advance Directives: Discussed with patient and family as appropriate.  Has patient completed advance directives? Yes, advance care planning is on file.    Rebeca - wife - is spokesperson          Immunization History   Administered Date(s) Administered     COVID-19,PF,Pfizer (12+ Yrs) 01/05/2021, 01/25/2021, 10/19/2021     COVID-19,PF,Pfizer 12+ Yrs (2022 and After) 04/24/2022     Flu, Unspecified 09/10/2020, 09/14/2021     HEPA 09/20/2007     HepB 12/01/1989, 01/01/1990, 06/01/1990     Influenza Not Indicated - By Hx 10/11/2017     Influenza Vaccine IM > 6 months Valent IIV4 (Alfuria,Fluzone) 11/19/2018, 11/17/2019, 09/14/2021     Meningococcal (Menactra ) 09/20/2007     Pneumo Conj 13-V (2010&after) 10/11/2019     TDAP Vaccine (Boostrix) 10/06/2017     Tdap (Adacel,Boostrix) 09/20/2007     Yellow Fever 09/20/2007     Zoster vaccine recombinant adjuvanted (SHINGRIX) 11/13/2019     Reviewed Immunization Record Today    Physical Exam     Vitals: /75   Pulse 58   Temp 98.1  F (36.7  C) (Oral)   Resp 16   Ht 1.854 m (6' 1\")   Wt 89.4 kg (197 lb)   SpO2 99%   BMI 25.99 " kg/m    BMI= Body mass index is 25.99 kg/m .     GENERAL APPEARANCE: alert and no distress  HENT: ear canals patent and TM's normal; mouth without ulcers or lesions; and oral mucous membranes moist  Hearing loss screen:   Normal   DENTITION:  Good repair?  yes  RESP: lungs clear to auscultation - no rales, rhonchi or wheezes  CV: regular rates and rhythm, no murmur, click or rub, no peripheral edema and peripheral pulses strong  SKIN: no suspicious lesions or rashes  NEURO: Normal strength and tone  MENTAL STATUS EXAM  Appearance: appropriate  Attitude: cooperative  Behavior: normal  Eye Contact: normal  Speech: normal  Orientation: oreinted to person , place, time and situation  Mood:  good  Affect: Mood Congruent  Thought Process: clear        Assessment and Plan       1. Medicare annual wellness visit, initial  2. Special screening for malignant neoplasms, colon  Colonoscopy  A1c  Vaccines  Lipids    - Adult Gastro Ref - Procedure Only; Future  - Hemoglobin A1c; Future    Fall risk - good  Cognitive Screen - good  Advanced Directives - 2020 - in EMR  Vision - good  Hearing - good  Feet - good  Dental - good  Screening - see AVS      3. Malignant melanoma of left upper extremity including shoulder (H)  Monitored by derm    4. Hyperlipidemia LDL goal <130  Repeat labs  Risk 12.7% (8% with high dose statin)  Statin decision making tool    - Lipid panel; Future  - Hemoglobin A1c; Future    5. Elevated TSH  - TSH with free T4 reflex; Future    6. Elevated prostate specific antigen (PSA)  7. Encounter for screening for malignant neoplasm of prostate   Had MRI and prostate biopsy 10/2021 - no cancer  Monitor PSA    - Prostate spec antigen screen; Future    8. Encounter for immunization   - Pneumococcal vaccine 23 valent PPSV23  (Pneumovax) [81700]      Welcome to Medicare Preventive Visit / Initial Medicare Annual Wellness Exam - reviewed Preventive Services and Plan form with patient as specified in Patient  Instructions.      Brent Gonzalez MD

## 2022-05-04 NOTE — PROGRESS NOTES
Scheduling:  If you had an XRay/CT/Ultrasound/MRI ordered the number is [phone] to schedule or change your radiology appointment.   [facility] - SERVICES  For Men   Review these tests with your doctor then decide which ones you want and take this page home for your reference     Immunization History   Administered Date(s) Administered     COVID-19,PF,Pfizer (12+ Yrs) 01/05/2021, 01/25/2021, 10/19/2021     COVID-19,PF,Pfizer 12+ Yrs (2022 and After) 04/24/2022     Flu, Unspecified 09/10/2020, 09/14/2021     HEPA 09/20/2007     HepB 12/01/1989, 01/01/1990, 06/01/1990     Influenza Not Indicated - By Hx 10/11/2017     Influenza Vaccine IM > 6 months Valent IIV4 (Alfuria,Fluzone) 11/19/2018, 11/17/2019, 09/14/2021     Meningococcal (Menactra ) 09/20/2007     Pneumo Conj 13-V (2010&after) 10/11/2019     TDAP Vaccine (Boostrix) 10/06/2017     Tdap (Adacel,Boostrix) 09/20/2007     Yellow Fever 09/20/2007     Zoster vaccine recombinant adjuvanted (SHINGRIX) 11/13/2019                                                       IMMUNIZATIONS Description Recommend today?     Influenza (flu shot) Helps to prevent flu; you should get it every year No; is up to date.   PCV 13 Prevents pneumonia; given once No; is up to date.   PPSV 23 Prevents pneumonia; given once Offer   Tetanus Prevents tetanus; need every 10 years No; is up to date.   Herpes Zoster (shingles) Prevents or lessens symptoms from shingles; given once.  Needs second dose  If you want this vaccine please go to a pharmacy to receive Shinrix   Hepatitis B  If you have any of the following risk factors you should be immunized for hepatitis B: severe kidney disease, people who live in the same house as a carrier of Hepatitis B virus, people who live in  institutions (e.g. nursing homes or group homes), homosexual men, patients with hemophilia who received Factor VIII or IX concentrates, abusers of illicit injectable drugs No; is  up to date.     Health Maintenance   Topic Date Due     DTAP/TDAP/TD IMMUNIZATION (1 - Tdap) 10/06/2017     ZOSTER IMMUNIZATION (2 of 2) 01/08/2020     FALL RISK ASSESSMENT  Never done     Pneumococcal Vaccine: 65+ Years (2 - PPSV23 or PCV20) 07/13/2021     AORTIC ANEURYSM SCREENING (SYSTEM ASSIGNED)  Never done     COLORECTAL CANCER SCREENING  09/08/2021     ADVANCE CARE PLANNING  12/22/2021     PHQ-2 (once per calendar year)  01/01/2022     MEDICARE ANNUAL WELLNESS VISIT  02/22/2022     LIPID  02/22/2026     HEPATITIS C SCREENING  Completed     HIV SCREENING  Completed     INFLUENZA VACCINE  Completed     COVID-19 Vaccine  Completed     Pneumococcal Vaccine: Pediatrics (0 to 5 Years) and At-Risk Patients (6 to 64 Years)  Aged Out     IPV IMMUNIZATION  Aged Out     MENINGITIS IMMUNIZATION  Aged Out     HEPATITIS B IMMUNIZATION  Aged Out         SCREENING TESTS     Description   Year of Last Screening   Recommended Today?   Heart disease screening blood tests    Cholesterol level Reducing cholesterol can reduce your risk of heart attacks by 25%.  Screening is recommended yearly if you are at risk of heart disease otherwise every 4-5 years  Yes; Recommended and ordered.     Diabetes screening tests    Hemoglobin A1c blood test   Finding and treating diabetes early can reduce complications.  Screening recommended/covered yearly if you have high blood pressure, high cholesterol, obesity (BMI >30), or a history of high blood glucose tests; or 2 of the following: family history of diabetes, overweight (BMI >25 but <30), age 65 years or older, and a history of diabetes of pregnancy or gave birth to baby weighing more than 9 lbs.    Yes; Recommended and ordered.   Hepatitis B screening Finding hepatitis B early can reduce complications.  Screening is recommended for persons with selected risk factors.  No: is not indicated today.   Hepatitis C screening Finding hepatitis C early can reduce complications.  Screening is  recommended for all persons born from 1945 through 1965 and for those with selected other risk factors.   No; is up to date.   HIV screening Finding HIV early can reduce complications.  Screening is recommended for persons with risk factors for HIV infection.  No; is up to date.   Glaucoma screening Early detection of glaucoma can prevent blindness.   Please talk to your eye doctor about this.       SCREENING TESTS     Description   Year of Last Screening   Recommended Today?   Colorectal cancer screening    Fecal occult blood test     Screening colonoscopy Screening for colon cancer has been shown to reduce death from colon cancer by 25-30%. Screening recommended to start at 50 years and continuing until age 75 years.    DISCUSS   Screening for Lung Cancer     Low-dose CT scanning Screening can reduce mortality in persons aged 55-80 who have smoked at least 30 pack-years and who are either still smoking or have quit in the past 15 years.  DISCUSS   Abdominal Aortic Aneurysm (AAA) screening    Ultrasound (US)   An aneurysm treated before rupture is very safe -a ruptured aneurysm can be fatal.  Screening  by US for AAA is limited to patients who meet one of the following criteria:    Men who are 65-75 years old and have smoked more than 100 cigarettes in their lifetime    Anyone with a family history of abdominal aortic aneurysm  DISCUSS       MEDICARE WELLNESS EXAM PATIENT INSTRUCTIONS    Yearly exam:     See your health care provider every year in order to review changes in your health, review medicines that you take, and discuss preventive care needs such as immunizations and cancer screening.    Get a flu shot each year.     Advance Directive:    If you have not done so, you are encouraged to complete an advance directive. If you would like support with this, please contact the clinic  through the main clinic line. More information about advance directives can be found at:      [email or web address]    Nutrition:     Eat at least 5 servings of fruits and vegetables each day.     Eat whole-grain bread, whole-wheat pasta and brown rice instead of white grains and rice.     Talk to your doctor about Calcium and Vitamin D.     Lifestyle:    Exercise for at least 150 minutes a week (30 minutes a day, 5 days a week). This will help you control your weight and prevent disease.     Limit alcohol to one drink per day.     If you smoke, try to quit - your doctor will be happy to help.     Wear sunscreen to prevent skin cancer.     See your dentist every six months for an exam and cleaning.     See your eye doctor every 1 to 2 years to screen for conditions such as glaucoma, macular degeneration and cataracts.

## 2022-05-05 ENCOUNTER — TELEPHONE (OUTPATIENT)
Dept: GASTROENTEROLOGY | Facility: CLINIC | Age: 67
End: 2022-05-05
Payer: COMMERCIAL

## 2022-05-05 NOTE — TELEPHONE ENCOUNTER
Screening Questions  BlueKIND OF PREP RedLOCATION [review exclusion criteria] GreenSEDATION TYPE  1. Have you had a positive covid test in the last 90 days? N     2. Do you have a legal guardian or medical Power of ?  Are you able to give consent for your medical care?Y (Sedation review/consideration needed)    3. Are you active on mychart? Y    4. What insurance is in the chart? MEDICA  NO MEDICARE     3.   Ordering/Referring Provider: TATIANA    4. BMI 25.7 [BMI OVER 40-EXTENDED PREP]  If greater than 40 review exclusion criteria [PAC APPT IF @ [facility]]        5.  Respiratory Screening :  [If yes to any of the following HOSPITAL setting only]     Do you use daily home oxygen? N    Do you have mod to severe Obstructive Sleep Apnea? N  [OKAY @ [facility] SH PH RI]   Do you have Pulmonary Hypertension? N     Do you have UNCONTROLLED asthma? N        6.   Have you had a heart or lung transplant? N      7.   Are you currently on dialysis? N [ If yes, G-PREP & HOSPITAL setting only]     8.   Do you have chronic kidney disease? N [ If yes, G-PREP ]    9.   Have you had a stroke or Transient ischemic attack (TIA - aka  mini stroke ) within 6 months?  N (If yes, please review exclusion criteria)    10.   In the past 6 months, have you had any heart related issues including cardiomyopathy or heart attack? N           If yes, did it require cardiac stenting or other implantable device?       11.   Do you have any implantable devices in your body (pacemaker, defib, LVAD)? N (If yes, please review exclusion criteria)    12.   Do you take nitroglycerin? N           If yes, how often?   (if yes, HOSPITAL setting ONLY)    13.   Are you currently taking any blood thinners? N           [IF YES, INFORM PATIENT TO FOLLOW UP W/ ORDERING PROVIDER FOR BRIDGING INSTRUCTIONS]     14.   Do you have a diagnosis of diabetes? N   [ If yes, G-PREP ]    15.   [FEMALES] Are you currently pregnant?     If yes, how many weeks?     16.    Are you taking any prescription pain medications on a routine schedule?  N  [ If yes, EXTENDED PREP.] [If yes, MAC]    17.   Do you have any chemical dependencies such as alcohol, street drugs, or methadone?  N [If yes, MAC]    18.   Do you have any history of post-traumatic stress syndrome, severe anxiety or history of psychosis?  N  [If yes, MAC]    19.   Do you transfer independently?  Y    20.  On a regular basis do you go 3-5 days between bowel movements? N   [ If yes, EXTENDED PREP.]    21.   Preferred LOCAL Pharmacy for Pre Prescription   [id number] [place] [address]      Scheduling Details      Caller : Juan Miguel Donovan    (Please ask for phone number if not scheduled by patient)    Type of Procedure Scheduled: COLON  Which Colonoscopy Prep was Sent?: PETER LAMAR CF PATIENTS & GROEN'S PATIENTS NEEDS EXTENDED PREP  Surgeon:   Date of Procedure: 6/14  Location: [facility]      Sedation Type: MAC  Conscious Sedation- Needs  for 6 hours after the procedure  MAC/General-Needs  for 24 hours after procedure    Pre-op Required at [facility], [place], [place] and OR for MAC sedation: N  (advise patient they will need a pre-op prior to procedure -)      Informed patient they will need an adult  Y  Cannot take any type of public or medical transportation alone    Pre-Procedure Covid test to be completed at [facility] or Externally: 6[place]10 [place]    Confirmed Nurse will call to complete assessment Y    Additional comments:

## 2022-06-01 ENCOUNTER — TELEPHONE (OUTPATIENT)
Dept: GASTROENTEROLOGY | Facility: CLINIC | Age: 67
End: 2022-06-01
Payer: COMMERCIAL

## 2022-06-01 DIAGNOSIS — Z11.59 ENCOUNTER FOR SCREENING FOR OTHER VIRAL DISEASES: Primary | ICD-10-CM

## 2022-06-01 NOTE — TELEPHONE ENCOUNTER
Attempted to contact patient regarding upcoming colonoscopy procedure on 6.14.2022 for pre assessment questions. No answer.     Left message to return call to [phone] #2    Covid test scheduled: 6.10.2022    Arrival time: 1000    Facility location: [facility]    Sedation type: MAC    Indication for procedure: screening colonoscopy    Referring provider: Brent Gonzalez    Bowel prep recommendation: Miralax/Magnesium citrate/Dulcolax    Laney Canales RN

## 2022-06-02 NOTE — TELEPHONE ENCOUNTER
Pre assessment questions completed for upcoming colonoscopy procedure scheduled on 6.14.2022    Pt would like to do at home rapid antigen test 1-2 days prior to procedure.    Reviewed procedural arrival time 1000 and facility location [facility].    Designated  policy reviewed. Instructed to have someone stay 24 hours post procedure.     Anticoagulation/blood thinners? no    Electronic implanted devices? no    Reviewed Miralax/Magnesium citrate/Dulcolax prep instructions with patient. No fiber/iron supplements or foods that contain nuts/seeds prior to procedure.     Patient verbalized understanding and had no questions or concerns at this time.    Laney Canales RN

## 2022-06-14 ENCOUNTER — DOCUMENTATION ONLY (OUTPATIENT)
Dept: GASTROENTEROLOGY | Facility: OUTPATIENT CENTER | Age: 67
End: 2022-06-14
Payer: COMMERCIAL

## 2022-06-14 ENCOUNTER — TRANSFERRED RECORDS (OUTPATIENT)
Dept: HEALTH INFORMATION MANAGEMENT | Facility: CLINIC | Age: 67
End: 2022-06-14
Payer: COMMERCIAL

## 2022-06-14 DIAGNOSIS — Z12.11 SPECIAL SCREENING FOR MALIGNANT NEOPLASMS, COLON: ICD-10-CM

## 2022-06-14 PROCEDURE — 88305 TISSUE EXAM BY PATHOLOGIST: CPT | Mod: TC,ORL | Performed by: INTERNAL MEDICINE

## 2022-06-14 PROCEDURE — 88305 TISSUE EXAM BY PATHOLOGIST: CPT | Mod: 26 | Performed by: PATHOLOGY

## 2022-06-15 ENCOUNTER — LAB REQUISITION (OUTPATIENT)
Dept: LAB | Facility: CLINIC | Age: 67
End: 2022-06-15
Payer: COMMERCIAL

## 2022-09-29 ENCOUNTER — OFFICE VISIT (OUTPATIENT)
Dept: DERMATOLOGY | Facility: CLINIC | Age: 67
End: 2022-09-29
Payer: COMMERCIAL

## 2022-09-29 DIAGNOSIS — L30.0 NUMMULAR ECZEMA: ICD-10-CM

## 2022-09-29 DIAGNOSIS — Z85.828 HISTORY OF NONMELANOMA SKIN CANCER: ICD-10-CM

## 2022-09-29 DIAGNOSIS — Z86.006 HISTORY OF MELANOMA IN SITU: ICD-10-CM

## 2022-09-29 DIAGNOSIS — D22.9 MULTIPLE MELANOCYTIC NEVI: ICD-10-CM

## 2022-09-29 DIAGNOSIS — L82.1 SEBORRHEIC KERATOSIS: ICD-10-CM

## 2022-09-29 DIAGNOSIS — L57.0 AK (ACTINIC KERATOSIS): Primary | ICD-10-CM

## 2022-09-29 PROCEDURE — 99214 OFFICE O/P EST MOD 30 MIN: CPT | Mod: 25 | Performed by: DERMATOLOGY

## 2022-09-29 PROCEDURE — 17003 DESTRUCT PREMALG LES 2-14: CPT | Performed by: DERMATOLOGY

## 2022-09-29 PROCEDURE — 17000 DESTRUCT PREMALG LESION: CPT | Performed by: DERMATOLOGY

## 2022-09-29 RX ORDER — CLOBETASOL PROPIONATE 0.5 MG/G
OINTMENT TOPICAL
Qty: 90 G | Refills: 3 | Status: SHIPPED | OUTPATIENT
Start: 2022-09-29 | End: 2024-03-12

## 2022-09-29 ASSESSMENT — PAIN SCALES - GENERAL: PAINLEVEL: NO PAIN (0)

## 2022-09-29 NOTE — NURSING NOTE
Dermatology Rooming Note    Juan Miguel Donovan's goals for this visit include:   Chief Complaint   Patient presents with     Skin Check     Juan Miguel is here today for a skin check.      SORIN oSto

## 2022-09-29 NOTE — LETTER
9/29/2022       RE: Juan Miguel Donovan  [address] [place] [address]     Dear Colleague,    Thank you for referring your patient, Juan Miguel Donovan, to the [facility] DERMATOLOGY CLINIC [place] at [facility]. Please see a copy of my visit note below.    [facility] Dermatology Note        Dermatology Problem List:  1) Hx of NMSC  - nBCC, nasolabial fold s/p MMS 1/15/16  - nBCC, apical lip, s/p MMS 1/15/16  2) Hx of Melanoma in situ, left dorsal forearm, s/p excision 10 years ago   3 AKs  - s/p cryotherapy 4/5/18  - Has used efudex in the past  4) Nummular eczema   5) [place]'s disease      Encounter Date: Sept 29, 2022    Chief Complaint: followup skin check, history of MIS    Subjective:  Juan Miguel is a very pleasant 67 year old male with a remote history of MIS of the left arm, s/p excision approx 12 years ago, also with history of 2 BCC.  Here today for followup skin check.  Last seen 6/8/21, he had 3 AKs treated with cryotherapy with good improvement.  Today again notes some rough scaly spots on forehead and ears, but no new or changing moles, and no nonhealing sores.  Has recurring eczema on hands, generally good control with clobetasol ointment.    ROS: no recent fevers.    Objective:  GENERAL:  This is a well-appearing, well-nourished male with a normal mood and affect, who is oriented x3.  SKIN:  A cutaneous exam of the head, neck, chest, abdomen, back, bilateral upper and lower extremities and buttocks was performed.  On the face, there are 2 linear, well-healed scars without nodularity or erythema.  On the left forearm, there is a linear to ovoid whitish scar without pigment or nodularity.   On the left upper outer forehead and right mid ear helix there are a total of 4 rough scaly pink 3-4mm papules.  Scattered on the dorsal hands, there are pink papules and thin plaques consistent with eczematous dermatitis.    Assessment and  Plan:  1.  History of melanoma in situ of the left arm approximately 10 to 12 years ago.  Clinically there is no evidence of recurrence today.  Reassurance was provided.  We discussed the ongoing importance of sun protective behaviors including high SPF sunscreens.  2.  History of basal cell carcinoma of the right nasofacial sulcus.  Similarly, there was no clinical evidence of recurrence today.  Reassurance was provided.  3.  Actinic keratoses x4, on the left upper outer forehead and right mid ear helix.  After informed verbal consent, each of these was treated with liquid nitrogen times 5 seconds x 2 cycles.  The patient tolerated this without complication.    4. Nummular eczema, predominantly affecting the hands.  He is doing relatively well with clobetasol ointment as needed.  We refilled his prescription today to continue clobetasol ointment applied twice daily to affected areas.  5.  Benign findings including solar lentigines, seborrheic keratosis and benign appearing nevi.  Reassurance was provided as to the benign appearance of these lesions today.    He will follow up in our clinic in 1 years time, sooner for any new or changing lesions.      Otto Lazcano MD  Dermatology Attending

## 2022-09-29 NOTE — PROGRESS NOTES
[facility] Dermatology Note        Dermatology Problem List:  1) Hx of NMSC  - nBCC, nasolabial fold s/p MMS 1/15/16  - nBCC, apical lip, s/p MMS 1/15/16  2) Hx of Melanoma in situ, left dorsal forearm, s/p excision 10 years ago   3 AKs  - s/p cryotherapy 4/5/18  - Has used efudex in the past  4) Nummular eczema   5) [place]'s disease      Encounter Date: Sept 29, 2022    Chief Complaint: followup skin check, history of MIS    Subjective:  Juan Miguel is a very pleasant 67 year old male with a remote history of MIS of the left arm, s/p excision approx 12 years ago, also with history of 2 BCC.  Here today for followup skin check.  Last seen 6/8/21, he had 3 AKs treated with cryotherapy with good improvement.  Today again notes some rough scaly spots on forehead and ears, but no new or changing moles, and no nonhealing sores.  Has recurring eczema on hands, generally good control with clobetasol ointment.    ROS: no recent fevers.    Objective:  GENERAL:  This is a well-appearing, well-nourished male with a normal mood and affect, who is oriented x3.  SKIN:  A cutaneous exam of the head, neck, chest, abdomen, back, bilateral upper and lower extremities and buttocks was performed.  On the face, there are 2 linear, well-healed scars without nodularity or erythema.  On the left forearm, there is a linear to ovoid whitish scar without pigment or nodularity.   On the left upper outer forehead and right mid ear helix there are a total of 4 rough scaly pink 3-4mm papules.  Scattered on the dorsal hands, there are pink papules and thin plaques consistent with eczematous dermatitis.    Assessment and Plan:  1.  History of melanoma in situ of the left arm approximately 10 to 12 years ago.  Clinically there is no evidence of recurrence today.  Reassurance was provided.  We discussed the ongoing importance of sun protective behaviors including high SPF sunscreens.  2.  History of basal cell carcinoma of the right  nasofacial sulcus.  Similarly, there was no clinical evidence of recurrence today.  Reassurance was provided.  3.  Actinic keratoses x4, on the left upper outer forehead and right mid ear helix.  After informed verbal consent, each of these was treated with liquid nitrogen times 5 seconds x 2 cycles.  The patient tolerated this without complication.    4. Nummular eczema, predominantly affecting the hands.  He is doing relatively well with clobetasol ointment as needed.  We refilled his prescription today to continue clobetasol ointment applied twice daily to affected areas.  5.  Benign findings including solar lentigines, seborrheic keratosis and benign appearing nevi.  Reassurance was provided as to the benign appearance of these lesions today.    He will follow up in our clinic in 1 years time, sooner for any new or changing lesions.      Otto Lazcano MD  Dermatology Attending

## 2022-10-14 ENCOUNTER — IMMUNIZATION (OUTPATIENT)
Dept: NURSING | Facility: CLINIC | Age: 67
End: 2022-10-14
Payer: COMMERCIAL

## 2022-10-14 ENCOUNTER — OFFICE VISIT (OUTPATIENT)
Dept: UROLOGY | Facility: CLINIC | Age: 67
End: 2022-10-14
Payer: COMMERCIAL

## 2022-10-14 VITALS
DIASTOLIC BLOOD PRESSURE: 70 MMHG | HEIGHT: 73 IN | BODY MASS INDEX: 25.18 KG/M2 | SYSTOLIC BLOOD PRESSURE: 138 MMHG | WEIGHT: 190 LBS

## 2022-10-14 DIAGNOSIS — R97.20 ELEVATED PROSTATE SPECIFIC ANTIGEN (PSA): Primary | ICD-10-CM

## 2022-10-14 PROCEDURE — 91312 COVID-19,PF,PFIZER BOOSTER BIVALENT: CPT

## 2022-10-14 PROCEDURE — 99213 OFFICE O/P EST LOW 20 MIN: CPT | Performed by: STUDENT IN AN ORGANIZED HEALTH CARE EDUCATION/TRAINING PROGRAM

## 2022-10-14 PROCEDURE — 0124A COVID-19,PF,PFIZER BOOSTER BIVALENT: CPT

## 2022-10-14 PROCEDURE — 36415 COLL VENOUS BLD VENIPUNCTURE: CPT | Performed by: STUDENT IN AN ORGANIZED HEALTH CARE EDUCATION/TRAINING PROGRAM

## 2022-10-14 PROCEDURE — 84153 ASSAY OF PSA TOTAL: CPT | Performed by: STUDENT IN AN ORGANIZED HEALTH CARE EDUCATION/TRAINING PROGRAM

## 2022-10-14 ASSESSMENT — PAIN SCALES - GENERAL: PAINLEVEL: NO PAIN (0)

## 2022-10-14 NOTE — LETTER
"10/14/2022       RE: Juan Miguel Donovan  [address] [place] [address]     Dear Colleague,    Thank you for referring your patient, Juan Miguel Donovan, to the [facility] [place] [facility]. Please see a copy of my visit note below.    CHIEF COMPLAINT   It was my pleasure to see Juan Miguel Donovan who is a 67 year old male for follow-up of elevated PSA.      HPI:  Juan Miguel Donovan is a 67 year old male being seen for follow-up.  Duration of problem: Few years  Previous treatments: None    Reviewed previous notes from Dr. Gonzalez  No current urological concerns  PSA is comparable to previous values based on lab results from May 2022      Exam:  /70   Ht 1.854 m (6' 1\")   Wt 86.2 kg (190 lb)   BMI 25.07 kg/m    General: age-appropriate appearing male in NAD sitting in an exam chair  Resp: no respiratory distress  CV: heart rate regular  Abdomen: Degree of obesity is mild. Abdomen is soft and nontender. No organomegaly.   : Enlarged prostate no nodule  Neuro: grossly non focal. Normal reflexes  Motor: excellent strength throughout    Review of Imaging:  The following imaging exams were independently viewed and interpreted by me and discussed with patient:      Review of Labs:  The following labs were reviewed by me and discussed with the patient:  PSA: Abnormal:    Latest Reference Range & Units 10/11/19 12:22 02/22/21 16:31 05/20/21 11:28 05/04/22 09:31   PSA 0.00 - 4.00 ug/L 3.07 6.04 (H) 5.78 (H) 5.75 (H)   (H): Data is abnormally high  Blood draw today for PSA has not resulted yet      Assessment & Plan     Elevated prostate specific antigen (PSA)  Based on the last PSA May 2022, no significant change and PSA appears to be stable  Do not recommend additional imaging at this point  PSA be done yearly basis with primary care along with rectal exam  See as if PSA rises and is concerning  - PSA tumor marker; Future  - PSA tumor marker    We will " update him on the latest PSA results  Michoacano Batista MD  [facility]OLOGY [facility]      ==========================    Additional Billing and Coding Information:  Review of external notes as documented above   Review of the result(s) of each unique test - PSA    Independent interpretation of a test performed by another physician/other qualified health care professional (not separately reported) -       Discussion of management or test interpretation with external physician/other qualified healthcare professional/appropriate source -           15 minutes spent on the date of the encounter doing chart review, review of test results, interpretation of tests, patient visit and documentation     ==========================

## 2022-10-14 NOTE — PROGRESS NOTES
"CHIEF COMPLAINT   It was my pleasure to see Juan Miguel Donovan who is a 67 year old male for follow-up of elevated PSA.      HPI:  Juan Miguel Donovan is a 67 year old male being seen for follow-up.  Duration of problem: Few years  Previous treatments: None    Reviewed previous notes from Dr. Gonzalez  No current urological concerns  PSA is comparable to previous values based on lab results from May 2022      Exam:  /70   Ht 1.854 m (6' 1\")   Wt 86.2 kg (190 lb)   BMI 25.07 kg/m    General: age-appropriate appearing male in NAD sitting in an exam chair  Resp: no respiratory distress  CV: heart rate regular  Abdomen: Degree of obesity is mild. Abdomen is soft and nontender. No organomegaly.   : Enlarged prostate no nodule  Neuro: grossly non focal. Normal reflexes  Motor: excellent strength throughout    Review of Imaging:  The following imaging exams were independently viewed and interpreted by me and discussed with patient:      Review of Labs:  The following labs were reviewed by me and discussed with the patient:  PSA: Abnormal:    Latest Reference Range & Units 10/11/19 12:22 02/22/21 16:31 05/20/21 11:28 05/04/22 09:31   PSA 0.00 - 4.00 ug/L 3.07 6.04 (H) 5.78 (H) 5.75 (H)   (H): Data is abnormally high  Blood draw today for PSA has not resulted yet      Assessment & Plan     Elevated prostate specific antigen (PSA)  Based on the last PSA May 2022, no significant change and PSA appears to be stable  Do not recommend additional imaging at this point  PSA be done yearly basis with primary care along with rectal exam  See as if PSA rises and is concerning  - PSA tumor marker; Future  - PSA tumor marker    We will update him on the latest PSA results  Michoacano Batista MD  [facility]OLOGY [facility]      ==========================    Additional Billing and Coding Information:  Review of external notes as documented above   Review of the result(s) of each unique test - PSA    Independent interpretation of " a test performed by another physician/other qualified health care professional (not separately reported) -       Discussion of management or test interpretation with external physician/other qualified healthcare professional/appropriate source -           15 minutes spent on the date of the encounter doing chart review, review of test results, interpretation of tests, patient visit and documentation     ==========================

## 2022-10-17 LAB — PSA SERPL-MCNC: 7.8 UG/L (ref 0–4)

## 2022-10-28 PROBLEM — L82.1 SEBORRHEIC KERATOSIS: Status: ACTIVE | Noted: 2022-10-28

## 2022-10-28 PROBLEM — D22.9 MULTIPLE MELANOCYTIC NEVI: Status: ACTIVE | Noted: 2022-10-28

## 2022-12-19 ENCOUNTER — MYC MEDICAL ADVICE (OUTPATIENT)
Dept: FAMILY MEDICINE | Facility: CLINIC | Age: 67
End: 2022-12-19

## 2022-12-19 DIAGNOSIS — J98.01 BRONCHOSPASM: ICD-10-CM

## 2022-12-19 NOTE — TELEPHONE ENCOUNTER
"Last seen 5/4/2022    Request for medication refill:  albuterol (PROAIR HFA/PROVENTIL HFA/VENTOLIN HFA) 108 (90 Base) MCG/ACT inhaler  Providers if patient needs an appointment and you are willing to give a one month supply please refill for one month and  send a letter/MyChart using \".SMILLIMITEDREFILL\" .smillimited and route chart to \"P SMI \" (Giving one month refill in non controlled medications is strongly recommended before denial)    If refill has been denied, meaning absolutely no refills without visit, please complete the smart phrase \".smirxrefuse\" and route it to the \"P SMI MED REFILLS\"  pool to inform the patient and the pharmacy.    Alana Martinez RN        "

## 2022-12-20 RX ORDER — ALBUTEROL SULFATE 90 UG/1
2 AEROSOL, METERED RESPIRATORY (INHALATION) EVERY 6 HOURS PRN
Qty: 8.5 G | Refills: 1 | Status: SHIPPED | OUTPATIENT
Start: 2022-12-20 | End: 2023-02-03

## 2023-02-03 DIAGNOSIS — J98.01 BRONCHOSPASM: ICD-10-CM

## 2023-02-03 RX ORDER — ALBUTEROL SULFATE 90 UG/1
2 AEROSOL, METERED RESPIRATORY (INHALATION) EVERY 6 HOURS PRN
Qty: 8.5 G | Refills: 1 | Status: SHIPPED | OUTPATIENT
Start: 2023-02-03 | End: 2023-08-14

## 2023-02-23 DIAGNOSIS — R97.20 ELEVATED PROSTATE SPECIFIC ANTIGEN (PSA): Primary | ICD-10-CM

## 2023-02-27 ENCOUNTER — OFFICE VISIT (OUTPATIENT)
Dept: FAMILY MEDICINE | Facility: CLINIC | Age: 68
End: 2023-02-27
Payer: COMMERCIAL

## 2023-02-27 VITALS
RESPIRATION RATE: 16 BRPM | HEART RATE: 66 BPM | WEIGHT: 193.5 LBS | TEMPERATURE: 98.1 F | OXYGEN SATURATION: 99 % | DIASTOLIC BLOOD PRESSURE: 85 MMHG | SYSTOLIC BLOOD PRESSURE: 138 MMHG | BODY MASS INDEX: 25.64 KG/M2 | HEIGHT: 73 IN

## 2023-02-27 DIAGNOSIS — J45.21 MILD INTERMITTENT ASTHMA WITH EXACERBATION: ICD-10-CM

## 2023-02-27 DIAGNOSIS — J02.9 ACUTE PHARYNGITIS, UNSPECIFIED ETIOLOGY: Primary | ICD-10-CM

## 2023-02-27 LAB
DEPRECATED S PYO AG THROAT QL EIA: NEGATIVE
GROUP A STREP BY PCR: NOT DETECTED

## 2023-02-27 PROCEDURE — 99213 OFFICE O/P EST LOW 20 MIN: CPT

## 2023-02-27 PROCEDURE — 87651 STREP A DNA AMP PROBE: CPT

## 2023-02-27 RX ORDER — PREDNISONE 20 MG/1
40 TABLET ORAL DAILY
Qty: 10 TABLET | Refills: 0 | Status: SHIPPED | OUTPATIENT
Start: 2023-02-27 | End: 2023-03-04

## 2023-02-27 ASSESSMENT — ENCOUNTER SYMPTOMS
SORE THROAT: 1
SINUS PRESSURE: 0
FEVER: 0
DIZZINESS: 0
CHILLS: 1
SHORTNESS OF BREATH: 1
WHEEZING: 1
COUGH: 1
PALPITATIONS: 0
WEAKNESS: 0
RHINORRHEA: 1

## 2023-02-27 NOTE — PATIENT INSTRUCTIONS
Home care  To help ease the symptoms of dysphagia:  Take any medicine you ve been given exactly as directed. Ask for liquid medicines if you need them.  To make eating easier:  Eat slowly.   Eat in a relaxed setting.  Don t talk while you eat.  Take small bites. Chew slowly and completely before you swallow.  Sit upright during and after meals. Chewing food releases enzymes in your mouth that start the digestive process. Chew soft foods at least 5 to10 times. Chew more dense food (meats and vegetables) up to 30 times before swallowing. Count the number of times you chew until you get a sense of how soft the food needs to be before swallowing.  Don't eat dry bread products or meat fibers.  Puree solid foods if needed. Thicken liquids with milk, juice, broth, gravy, or starch to make them easier to swallow.  Ask your healthcare provider if a liquid diet may be better for you.  Follow-up care  Follow up with your healthcare provider or as directed. Your healthcare provider can give you information about tests you may need.   When to seek medical advice  Call your healthcare provider right away for any of the following:  Inability to keep down food or liquid  Symptoms that get worse quickly  Coughing that won't stop  Continuing to lose weight  Fever of 100.4 F (38 C) or higher, or as directed by your healthcare provider  Other symptoms as indicated by your healthcare provider  Call 911  Call 911 for any of the following:  Trouble breathing  Inability to talk  Drooling, inability to control secretions  Loss of consciousness

## 2023-02-27 NOTE — PROGRESS NOTES
Problem List Items Addressed This Visit        Respiratory    Acute pharyngitis, unspecified etiology - Primary     Rapid strep was negative in clinic today; we will follow-up on culture results.  Discussed symptomatic cares.  We discussed that he could be having some inflammation in the throat related to an upper respiratory viral infection, which is more likely since these 2 complaints started at the same time. Pain has improved, but sensation of swelling remains. However, if his dysphagia worsens in any way this warrants further work-up/assessment.  I am going to treat him additionally for an assumed asthma exacerbation, with his report of wheezing and shortness of breath especially while laying flat with prednisone x5 days. Expected therapeutic effects and potential side effects discussed.  The prednisone could theoretically help with any inflammation in the back of the throat as well.  Information on dysphagia specific to small bites to, warm liquids, and chin tucking were given.  Discussed other supportive cares. Again, patient was instructed to follow-up if infectious symptoms resolve and he is still having this complaint.  Patient expressed understanding of and agreement with this plan.  All questions were answered.         Relevant Medications    predniSONE (DELTASONE) 20 MG tablet    Other Relevant Orders    Streptococcus A Rapid Screen w/Reflex to PCR - Clinic Collect (Completed)    Group A Streptococcus PCR Throat Swab    Mild intermittent asthma with exacerbation    Relevant Medications    predniSONE (DELTASONE) 20 MG tablet      SHUBHAM Pham CNP on 2/27/2023 at 11:16 AM       Hoang Bullard is a 67 year old who presents for the following health issues  Chief Complaint   Patient presents with     Cough     3 days  Neg covid test     Pharyngitis     Feels swellen       -Symptoms began Friday with a severe sore throat. Felt like he 'had a pile of crap' sitting in his throat. Was with his  reed, one of whom was sick with a virus.  -Over the weekend, he has had generalized upper respiratory viral infectious symptoms, including a cough, 1 day of chills, postnasal drip.  His biggest complaint is he reports he feels as if his throat is swollen to the point where it is difficulty swallowing specifically pills.  He will occasionally have issues with food as well, but is able to keep food down.  Additionally, he does have a history significant for asthma and reports that he is having some mild shortness of breath specifically when lying flat.  He has used albuterol inhaler, which does help with his symptoms.  -Has never had this problem in the past    Cough  Associated symptoms include chills, rhinorrhea, sore throat, shortness of breath (at night while laying down) and wheezing. Pertinent negatives include no chest pain.   Pharyngitis   Associated symptoms include shortness of breath (at night while laying down) and cough. Pertinent negatives include no congestion.   History of Present Illness       Reason for visit:  Sore throat - difficulty swallowing  Symptom onset:  3-7 days ago  Symptoms include:  Sore throat, difficulty swallowing, coughing, difficulty drinking fluids and eating  Symptom intensity:  Moderate  Symptom progression:  Staying the same  Had these symptoms before:  No  What makes it worse:  Lying flat, talking  What makes it better:  Being upright, minimize talking    He eats 2-3 servings of fruits and vegetables daily.He consumes 0 sweetened beverage(s) daily.He exercises with enough effort to increase his heart rate 20 to 29 minutes per day.  He exercises with enough effort to increase his heart rate 4 days per week.   He is taking medications regularly.       Review of Systems   Constitutional: Positive for chills. Negative for fever.   HENT: Positive for postnasal drip, rhinorrhea and sore throat. Negative for congestion and sinus pressure.    Respiratory: Positive for cough,  "shortness of breath (at night while laying down) and wheezing.    Cardiovascular: Negative for chest pain and palpitations.   Neurological: Negative for dizziness, syncope and weakness.            Objective    /85 (BP Location: Left arm, Patient Position: Sitting, Cuff Size: Adult Large)   Pulse 66   Temp 98.1  F (36.7  C) (Oral)   Resp 16   Ht 1.854 m (6' 1\")   Wt 87.8 kg (193 lb 8 oz)   SpO2 99%   BMI 25.53 kg/m    Body mass index is 25.53 kg/m .  Physical Exam  Vitals and nursing note reviewed.   Constitutional:       Appearance: Normal appearance.   HENT:      Head: Normocephalic and atraumatic.      Right Ear: Tympanic membrane, ear canal and external ear normal.      Left Ear: Tympanic membrane, ear canal and external ear normal.      Nose: Nose normal.      Mouth/Throat:      Lips: Pink.      Mouth: Mucous membranes are moist.      Pharynx: Oropharyngeal exudate and posterior oropharyngeal erythema present.      Tonsils: No tonsillar exudate or tonsillar abscesses.   Eyes:      Conjunctiva/sclera: Conjunctivae normal.      Pupils: Pupils are equal, round, and reactive to light.   Cardiovascular:      Rate and Rhythm: Normal rate and regular rhythm.   Pulmonary:      Effort: Pulmonary effort is normal. No respiratory distress.      Breath sounds: No stridor. No wheezing or rales.   Musculoskeletal:      Cervical back: Normal range of motion and neck supple. No rigidity or tenderness.   Lymphadenopathy:      Cervical: Cervical adenopathy present.   Skin:     General: Skin is warm.      Findings: No rash.   Neurological:      General: No focal deficit present.      Mental Status: He is alert.   Psychiatric:         Mood and Affect: Mood normal.         Behavior: Behavior normal.         Thought Content: Thought content normal.        Results for orders placed or performed in visit on 02/27/23 (from the past 24 hour(s))   Streptococcus A Rapid Screen w/Reflex to PCR - Clinic Collect    Specimen: " Throat; Swab   Result Value Ref Range    Group A Strep antigen Negative Negative         This note has been dictated using voice recognition software. Any grammatical or context distortions are unintentional and inherent to the software

## 2023-02-27 NOTE — ASSESSMENT & PLAN NOTE
Rapid strep was negative in clinic today; we will follow-up on culture results.  Discussed symptomatic cares.  We discussed that he could be having some inflammation in the throat related to an upper respiratory viral infection, which is more likely since these 2 complaints started at the same time. Pain has improved, but sensation of swelling remains. However, if his dysphagia worsens in any way this warrants further work-up/assessment.  I am going to treat him additionally for an assumed asthma exacerbation, with his report of wheezing and shortness of breath especially while laying flat with prednisone x5 days. Expected therapeutic effects and potential side effects discussed.  The prednisone could theoretically help with any inflammation in the back of the throat as well.  Information on dysphagia specific to small bites to, warm liquids, and chin tucking were given.  Discussed other supportive cares. Again, patient was instructed to follow-up if infectious symptoms resolve and he is still having this complaint.  Patient expressed understanding of and agreement with this plan.  All questions were answered.

## 2023-03-23 ENCOUNTER — LAB (OUTPATIENT)
Dept: LAB | Facility: CLINIC | Age: 68
End: 2023-03-23
Payer: COMMERCIAL

## 2023-03-23 DIAGNOSIS — R97.20 ELEVATED PROSTATE SPECIFIC ANTIGEN (PSA): ICD-10-CM

## 2023-03-23 LAB — PSA SERPL DL<=0.01 NG/ML-MCNC: 8.13 NG/ML (ref 0–4.5)

## 2023-03-23 PROCEDURE — 36415 COLL VENOUS BLD VENIPUNCTURE: CPT | Performed by: PATHOLOGY

## 2023-03-23 PROCEDURE — 84153 ASSAY OF PSA TOTAL: CPT | Performed by: PATHOLOGY

## 2023-04-08 DIAGNOSIS — R97.20 ELEVATED PROSTATE SPECIFIC ANTIGEN (PSA): Primary | ICD-10-CM

## 2023-05-21 ENCOUNTER — ANCILLARY PROCEDURE (OUTPATIENT)
Dept: MRI IMAGING | Facility: CLINIC | Age: 68
End: 2023-05-21
Attending: STUDENT IN AN ORGANIZED HEALTH CARE EDUCATION/TRAINING PROGRAM
Payer: COMMERCIAL

## 2023-05-21 DIAGNOSIS — R97.20 ELEVATED PROSTATE SPECIFIC ANTIGEN (PSA): ICD-10-CM

## 2023-05-21 PROCEDURE — A9585 GADOBUTROL INJECTION: HCPCS | Performed by: RADIOLOGY

## 2023-05-21 PROCEDURE — 72197 MRI PELVIS W/O & W/DYE: CPT | Performed by: RADIOLOGY

## 2023-05-21 RX ORDER — GADOBUTROL 604.72 MG/ML
10 INJECTION INTRAVENOUS ONCE
Status: COMPLETED | OUTPATIENT
Start: 2023-05-21 | End: 2023-05-21

## 2023-05-21 RX ADMIN — GADOBUTROL 10 ML: 604.72 INJECTION INTRAVENOUS at 08:03

## 2023-07-09 ENCOUNTER — HEALTH MAINTENANCE LETTER (OUTPATIENT)
Age: 68
End: 2023-07-09

## 2023-08-07 ASSESSMENT — ENCOUNTER SYMPTOMS
SHORTNESS OF BREATH: 0
CONSTIPATION: 0
DIZZINESS: 0
NERVOUS/ANXIOUS: 0
PARESTHESIAS: 1
PALPITATIONS: 0
ABDOMINAL PAIN: 0
MYALGIAS: 0
EYE PAIN: 0
SORE THROAT: 0
HEMATURIA: 0
FREQUENCY: 0
WEAKNESS: 0
NAUSEA: 0
JOINT SWELLING: 0
HEMATOCHEZIA: 0
COUGH: 0
HEADACHES: 0
FEVER: 0
DYSURIA: 0
DIARRHEA: 0
ARTHRALGIAS: 0
CHILLS: 0
HEARTBURN: 0

## 2023-08-07 ASSESSMENT — ASTHMA QUESTIONNAIRES: ACT_TOTALSCORE: 25

## 2023-08-07 ASSESSMENT — ACTIVITIES OF DAILY LIVING (ADL): CURRENT_FUNCTION: NO ASSISTANCE NEEDED

## 2023-08-14 ENCOUNTER — OFFICE VISIT (OUTPATIENT)
Dept: FAMILY MEDICINE | Facility: CLINIC | Age: 68
End: 2023-08-14
Payer: COMMERCIAL

## 2023-08-14 VITALS
OXYGEN SATURATION: 99 % | RESPIRATION RATE: 16 BRPM | DIASTOLIC BLOOD PRESSURE: 81 MMHG | SYSTOLIC BLOOD PRESSURE: 133 MMHG | BODY MASS INDEX: 26.4 KG/M2 | HEIGHT: 73 IN | WEIGHT: 199.2 LBS | HEART RATE: 57 BPM

## 2023-08-14 DIAGNOSIS — Z22.7 LTBI (LATENT TUBERCULOSIS INFECTION): ICD-10-CM

## 2023-08-14 DIAGNOSIS — Z13.220 SCREENING FOR LIPID DISORDERS: ICD-10-CM

## 2023-08-14 DIAGNOSIS — C43.9 MALIGNANT MELANOMA, UNSPECIFIED SITE (H): ICD-10-CM

## 2023-08-14 DIAGNOSIS — R20.2 PARESTHESIA OF BOTH FEET: ICD-10-CM

## 2023-08-14 DIAGNOSIS — Z00.00 ENCOUNTER FOR MEDICARE ANNUAL WELLNESS EXAM: Primary | ICD-10-CM

## 2023-08-14 DIAGNOSIS — R97.20 ELEVATED PROSTATE SPECIFIC ANTIGEN (PSA): ICD-10-CM

## 2023-08-14 DIAGNOSIS — Z13.228 SCREENING FOR METABOLIC DISORDER: ICD-10-CM

## 2023-08-14 DIAGNOSIS — R73.03 PREDIABETES: ICD-10-CM

## 2023-08-14 DIAGNOSIS — Z13.0 SCREENING FOR DEFICIENCY ANEMIA: ICD-10-CM

## 2023-08-14 DIAGNOSIS — J45.21 MILD INTERMITTENT ASTHMA WITH EXACERBATION: ICD-10-CM

## 2023-08-14 DIAGNOSIS — R79.89 ELEVATED TSH: ICD-10-CM

## 2023-08-14 DIAGNOSIS — J98.01 BRONCHOSPASM: ICD-10-CM

## 2023-08-14 LAB
ALBUMIN SERPL BCG-MCNC: 4.5 G/DL (ref 3.5–5.2)
ALP SERPL-CCNC: 64 U/L (ref 40–129)
ALT SERPL W P-5'-P-CCNC: 24 U/L (ref 0–70)
ANION GAP SERPL CALCULATED.3IONS-SCNC: 10 MMOL/L (ref 7–15)
AST SERPL W P-5'-P-CCNC: 31 U/L (ref 0–45)
BILIRUB SERPL-MCNC: 0.8 MG/DL
BUN SERPL-MCNC: 19.5 MG/DL (ref 8–23)
CALCIUM SERPL-MCNC: 9.4 MG/DL (ref 8.8–10.2)
CHLORIDE SERPL-SCNC: 104 MMOL/L (ref 98–107)
CHOLEST SERPL-MCNC: 241 MG/DL
CREAT SERPL-MCNC: 1.18 MG/DL (ref 0.67–1.17)
DEPRECATED HCO3 PLAS-SCNC: 25 MMOL/L (ref 22–29)
ERYTHROCYTE [DISTWIDTH] IN BLOOD BY AUTOMATED COUNT: 13.2 % (ref 10–15)
GFR SERPL CREATININE-BSD FRML MDRD: 68 ML/MIN/1.73M2
GLUCOSE SERPL-MCNC: 94 MG/DL (ref 70–99)
HBA1C MFR BLD: 5.7 % (ref 0–5.6)
HCT VFR BLD AUTO: 43.6 % (ref 40–53)
HDLC SERPL-MCNC: 46 MG/DL
HGB BLD-MCNC: 15.1 G/DL (ref 13.3–17.7)
LDLC SERPL CALC-MCNC: 172 MG/DL
MCH RBC QN AUTO: 28.9 PG (ref 26.5–33)
MCHC RBC AUTO-ENTMCNC: 34.6 G/DL (ref 31.5–36.5)
MCV RBC AUTO: 84 FL (ref 78–100)
NONHDLC SERPL-MCNC: 195 MG/DL
PLATELET # BLD AUTO: 183 10E3/UL (ref 150–450)
POTASSIUM SERPL-SCNC: 4.3 MMOL/L (ref 3.4–5.3)
PROT SERPL-MCNC: 6.9 G/DL (ref 6.4–8.3)
RBC # BLD AUTO: 5.22 10E6/UL (ref 4.4–5.9)
SODIUM SERPL-SCNC: 139 MMOL/L (ref 136–145)
TRIGL SERPL-MCNC: 117 MG/DL
TSH SERPL DL<=0.005 MIU/L-ACNC: 3.52 UIU/ML (ref 0.3–4.2)
WBC # BLD AUTO: 6.3 10E3/UL (ref 4–11)

## 2023-08-14 PROCEDURE — 83036 HEMOGLOBIN GLYCOSYLATED A1C: CPT

## 2023-08-14 PROCEDURE — 99397 PER PM REEVAL EST PAT 65+ YR: CPT

## 2023-08-14 PROCEDURE — 85027 COMPLETE CBC AUTOMATED: CPT

## 2023-08-14 PROCEDURE — 80061 LIPID PANEL: CPT

## 2023-08-14 PROCEDURE — 99213 OFFICE O/P EST LOW 20 MIN: CPT | Mod: 25

## 2023-08-14 PROCEDURE — 84443 ASSAY THYROID STIM HORMONE: CPT

## 2023-08-14 PROCEDURE — 80053 COMPREHEN METABOLIC PANEL: CPT

## 2023-08-14 PROCEDURE — 36415 COLL VENOUS BLD VENIPUNCTURE: CPT

## 2023-08-14 RX ORDER — ALBUTEROL SULFATE 90 UG/1
2 AEROSOL, METERED RESPIRATORY (INHALATION) EVERY 6 HOURS PRN
Qty: 8.5 G | Refills: 3 | Status: SHIPPED | OUTPATIENT
Start: 2023-08-14

## 2023-08-14 ASSESSMENT — ENCOUNTER SYMPTOMS
PALPITATIONS: 0
NAUSEA: 0
HEARTBURN: 0
CHILLS: 0
FREQUENCY: 0
COUGH: 0
EYE PAIN: 0
WEAKNESS: 0
HEMATURIA: 0
JOINT SWELLING: 0
NERVOUS/ANXIOUS: 0
SORE THROAT: 0
HEMATOCHEZIA: 0
SHORTNESS OF BREATH: 0
ARTHRALGIAS: 0
DIARRHEA: 0
HEADACHES: 0
MYALGIAS: 0
FEVER: 0
PARESTHESIAS: 1
ABDOMINAL PAIN: 0
DIZZINESS: 0
CONSTIPATION: 0
DYSURIA: 0

## 2023-08-14 ASSESSMENT — ACTIVITIES OF DAILY LIVING (ADL): CURRENT_FUNCTION: NO ASSISTANCE NEEDED

## 2023-08-14 NOTE — PROGRESS NOTES
"SUBJECTIVE:   Juan Miguel is a pleasant 68 year old who presents for Preventive Visit.He has no acute concerns related to his health. Needs to establish with new clinic due to recent move.         8/14/2023     8:17 AM   Additional Questions   Roomed by Bubba Wilkins MA   Accompanied by Self         8/14/2023     8:17 AM   Patient Reported Additional Medications   Patient reports taking the following new medications None     Are you in the first 12 months of your Medicare coverage?  No    Healthy Habits:     In general, how would you rate your overall health?  Excellent    Frequency of exercise:  4-5 days/week    Duration of exercise:  30-45 minutes    Do you usually eat at least 4 servings of fruit and vegetables a day, include whole grains    & fiber and avoid regularly eating high fat or \"junk\" foods?  No    Taking medications regularly:  Yes    Medication side effects:  None    Ability to successfully perform activities of daily living:  No assistance needed    Home Safety:  No safety concerns identified    Hearing Impairment:  Find that men's voices are easier to understand than woman's    In the past 6 months, have you been bothered by leaking of urine?  No    In general, how would you rate your overall mental or emotional health?  Excellent    Additional concerns today:  No    Not a 'formal exerciser' but remains active on a regular basis. Wife likes to walk.  Well rounded diet.  Works as an orthodontist.     Have you ever done Advance Care Planning? (For example, a Health Directive, POLST, or a discussion with a medical provider or your loved ones about your wishes): Yes, advance care planning is on file.    Fall risk  Fallen 2 or more times in the past year?: No  Any fall with injury in the past year?: No    Cognitive Screening   1) Repeat 3 items (Leader, Season, Table)    2) Clock draw: NORMAL  3) 3 item recall: Recalls 3 objects  Results: 3 items recalled: COGNITIVE IMPAIRMENT LESS LIKELY    Mini-CogTM Copyright " LINDA Morfin. Licensed by the author for use in [facility]; reprinted with permission (marito@[facility]). All rights reserved.      Do you have sleep apnea, excessive snoring or daytime drowsiness? : no    Reviewed and updated as needed this visit by clinical staff   Tobacco  Allergies  Meds  Problems  Med Hx  Surg Hx  Fam Hx  Soc   Hx        Reviewed and updated as needed this visit by Provider   Tobacco  Allergies  Meds  Problems  Med Hx  Surg Hx  Fam Hx         Social History     Tobacco Use    Smoking status: Never    Smokeless tobacco: Never   Substance Use Topics    Alcohol use: Yes     Comment: socially, CAGE-neg         8/7/2023    11:16 AM   Alcohol Use   Prescreen: >3 drinks/day or >7 drinks/week? No     Do you have a current opioid prescription? No  Do you use any other controlled substances or medications that are not prescribed by a provider? None      Current providers sharing in care for this patient include:   Patient Care Team:  Maribel Murrieta APRN CNP as PCP - General (Family Medicine)  Ching Beckham MD as MD (Dermatology)  Otto Lazcano MD as MD (Dermapathology)  Michoacano Batista MD as Assigned Surgical Provider  Maribel Murrieta APRN CNP as Assigned PCP    The following health maintenance items are reviewed in Epic and correct as of today:  Health Maintenance   Topic Date Due    COVID-19 Vaccine (6 - Pfizer series) 02/14/2023    MEDICARE ANNUAL WELLNESS VISIT  05/04/2023    INFLUENZA VACCINE (1) 09/01/2023    ASTHMA CONTROL TEST  02/14/2024    ANNUAL REVIEW OF HM ORDERS  08/14/2024    ASTHMA ACTION PLAN  08/14/2024    FALL RISK ASSESSMENT  08/14/2024    LIPID  05/04/2027    DTAP/TDAP/TD IMMUNIZATION (3 - Td or Tdap) 10/06/2027    ADVANCE CARE PLANNING  08/14/2028    COLORECTAL CANCER SCREENING  06/14/2032    HEPATITIS C SCREENING  Completed    PHQ-2 (once per calendar year)  Completed    Pneumococcal Vaccine: 65+ Years  Completed    ZOSTER  "IMMUNIZATION  Completed    IPV IMMUNIZATION  Aged Out    MENINGITIS IMMUNIZATION  Aged Out    AORTIC ANEURYSM SCREENING (SYSTEM ASSIGNED)  Discontinued     Review of Systems   Constitutional:  Negative for chills and fever.   HENT:  Negative for congestion, ear pain, hearing loss and sore throat.    Eyes:  Negative for pain and visual disturbance.   Respiratory:  Negative for cough and shortness of breath.    Cardiovascular:  Negative for chest pain, palpitations and peripheral edema.   Gastrointestinal:  Negative for abdominal pain, constipation, diarrhea, heartburn, hematochezia and nausea.   Genitourinary:  Negative for dysuria, frequency, genital sores, hematuria, impotence, penile discharge and urgency.   Musculoskeletal:  Negative for arthralgias, joint swelling and myalgias.   Skin:  Negative for rash.   Neurological:  Positive for paresthesias. Negative for dizziness, weakness and headaches.   Psychiatric/Behavioral:  Negative for mood changes. The patient is not nervous/anxious.      OBJECTIVE:   /81 (BP Location: Left arm, Patient Position: Sitting, Cuff Size: Adult Regular)   Pulse 57   Resp 16   Ht 1.854 m (6' 1\")   Wt 90.4 kg (199 lb 3.2 oz)   SpO2 99%   BMI 26.28 kg/m   Estimated body mass index is 26.28 kg/m  as calculated from the following:    Height as of this encounter: 1.854 m (6' 1\").    Weight as of this encounter: 90.4 kg (199 lb 3.2 oz).    Physical Exam  GENERAL: healthy, alert and no distress  EYES: Eyes grossly normal to inspection, PERRL and conjunctivae and sclerae normal  HENT: ear canals and TM's normal, nose and mouth without ulcers or lesions  NECK: no adenopathy, no asymmetry, masses, or scars and thyroid normal to palpation  RESP: lungs clear to auscultation - no rales, rhonchi or wheezes  CV: regular rate and rhythm, normal S1 S2, no S3 or S4, no murmur, click or rub, no peripheral edema and peripheral pulses strong  ABDOMEN: soft, nontender, no hepatosplenomegaly, no " masses and bowel sounds normal  MS: no gross musculoskeletal defects noted, no edema  SKIN: no suspicious lesions or rashes  NEURO: Normal strength and tone, mentation intact and speech normal  PSYCH: mentation appears normal, affect normal/bright    Diagnostic Test Results:  Labs reviewed in Epic    ASSESSMENT / PLAN:     Problem List Items Addressed This Visit          Nervous and Auditory    Paresthesia of both feet     Symptoms ongoing multiple years. Had lab workup and exam done with his previous PCP which showed subclinical hypothyroidism and prediabetes, but no abnormalities in his B12. He has not had EMG testing done. Symptoms are not overly bothersome to him today. He does not desire additional workup or treatment. We reviewed we could trial something such as gabapentin and formal EMG testing if symptoms worsen in any way.            Respiratory    Bronchospasm    Relevant Medications    albuterol (PROAIR HFA/PROVENTIL HFA/VENTOLIN HFA) 108 (90 Base) MCG/ACT inhaler    Mild intermittent asthma with exacerbation     Current medications include albuterol inhaler as needed. Triggers include upper respiratory illness and cold weather primarily. Has not used albuterol in many months. Refill provided today and asthma action plan sent via [employer].         Relevant Medications    albuterol (PROAIR HFA/PROVENTIL HFA/VENTOLIN HFA) 108 (90 Base) MCG/ACT inhaler       Endocrine    Prediabetes     Last hemoglobin A1C was 5.8. Updated labs today.          Relevant Orders    Hemoglobin A1c       Infectious/Inflammatory    LTBI (latent tuberculosis infection)     Underwent treatment while in the .            Other    Melanoma (H)     Follows with dermatology annually (Dr. Lazcano). He is due for his next appointment in the coming months and plans to schedule.         Elevated prostate specific antigen (PSA)     Follows with urology. Initially underwent MRI workup with a biopsy that resulted normal. His most recent  "PSA in March was elevated again to 8.4, therefore he had MRI imaging repeated. This was normal, therefore in consultation with his urologist he has elected for watchful waiting.         Elevated TSH     Subclinical hypothyroidism in 2019 with a TSH of 5.79. Did not undergo treatment with medication at that time and his subsequent TSH labs have been within range. Updated labs today.         Relevant Orders    TSH with free T4 reflex    Encounter for Medicare annual wellness exam - Primary     Annual exam.  Colon cancer: UTD  Prostate cancer: UTD. Follows with urology.  Labs: Fasting lipids. Has not had baseline CMP/CBC in the past, so is amenable to obtaining these today.   BMI 26: Discussed diet and exercise. Does NOT have a sedentary lifestyle per report.  Immunizations: UTD. Consider COVID booster in the fall.   Regular eye exam. Regular dental care.  No falls. Cognitively intact.  AVS provided. Nancy is active.  Follow up in one year for MWV or sooner if indicated/needed.          Other Visit Diagnoses       Screening for lipid disorders        Relevant Orders    Lipid panel reflex to direct LDL Fasting    Screening for metabolic disorder        Relevant Orders    Comprehensive metabolic panel (BMP + Alb, Alk Phos, ALT, AST, Total. Bili, TP)    Screening for deficiency anemia        Relevant Orders    CBC with platelets            Patient has been advised of split billing requirements and indicates understanding: Yes      COUNSELING:  Reviewed preventive health counseling, as reflected in patient instructions       Regular exercise       Healthy diet/nutrition       Vision screening       Dental care       Alcohol Use        Colon cancer screening       Prostate cancer screening      BMI:   Estimated body mass index is 26.28 kg/m  as calculated from the following:    Height as of this encounter: 1.854 m (6' 1\").    Weight as of this encounter: 90.4 kg (199 lb 3.2 oz).         He reports that he has never " smoked. He has never used smokeless tobacco.      Appropriate preventive services were discussed with this patient, including applicable screening as appropriate for cardiovascular disease, diabetes, osteopenia/osteoporosis, and glaucoma.  As appropriate for age/gender, discussed screening for colorectal cancer, prostate cancer, breast cancer, and cervical cancer. Checklist reviewing preventive services available has been given to the patient.    Reviewed patients plan of care and provided an AVS. The Intermediate Care Plan ( asthma action plan, low back pain action plan, and migraine action plan) for Juan Miguel meets the Care Plan requirement. This Care Plan has been established and reviewed with the Patient.          SHUBHAM Pham CNP  [facility]    Identified Health Risks:  I have reviewed Opioid Use Disorder and Substance Use Disorder risk factors and made any needed referrals. The patient was counseled and encouraged to consider modifying their diet and eating habits. He was provided with information on recommended healthy diet options.  The patient was provided with written information regarding signs of hearing loss.

## 2023-08-14 NOTE — ASSESSMENT & PLAN NOTE
Subclinical hypothyroidism in 2019 with a TSH of 5.79. Did not undergo treatment with medication at that time and his subsequent TSH labs have been within range. Updated labs today.   pt rec'd supine in bed, IV, PCA, SCDs, monitors, frank, O2 at 4LPM. pt initially jovial, joking then becoming little irritable and dismissive of PT's instructions

## 2023-08-14 NOTE — ASSESSMENT & PLAN NOTE
Follows with dermatology annually (Dr. Lazcano). He is due for his next appointment in the coming months and plans to schedule.

## 2023-08-14 NOTE — ASSESSMENT & PLAN NOTE
Symptoms ongoing multiple years. Had lab workup and exam done with his previous PCP which showed subclinical hypothyroidism and prediabetes, but no abnormalities in his B12. He has not had EMG testing done. Symptoms are not overly bothersome to him today. He does not desire additional workup or treatment. We reviewed we could trial something such as gabapentin and formal EMG testing if symptoms worsen in any way.

## 2023-08-14 NOTE — PATIENT INSTRUCTIONS
Patient Education   Personalized Prevention Plan  You are due for the preventive services outlined below.  Your care team is available to assist you in scheduling these services.  If you have already completed any of these items, please share that information with your care team to update in your medical record.  Health Maintenance Due   Topic Date Due     COVID-19 Vaccine (6 - Pfizer series) 02/14/2023     Annual Wellness Visit  05/04/2023     Learning About Dietary Guidelines  What are the Dietary Guidelines for Americans?     Dietary Guidelines for Americans provide tips for eating well and staying healthy. This helps reduce the risk for long-term (chronic) diseases.  These guidelines recommend that you:  Eat and drink the right amount for you. The [place] government's food guide is called MyPlate. It can help you make your own well-balanced eating plan.  Try to balance your eating with your activity. This helps you stay at a healthy weight.  Drink alcohol in moderation, if at all.  Limit foods high in salt, saturated fat, trans fat, and added sugar.  These guidelines are from the U.S. Department of Agriculture and the U.S. Department of Health and Human Services. They are updated every 5 years.  What is MyPlate?  MyPlate is the [place] government's food guide. It can help you make your own well-balanced eating plan. A balanced eating plan means that you eat enough, but not too much, and that your food gives you the nutrients you need to stay healthy.  MyPlate focuses on eating plenty of whole grains, fruits, and vegetables, and on limiting fat and sugar. It is available online at www.ChooseMyPlate.gov.  How can you get started?  If you're trying to eat healthier, you can slowly change your eating habits over time. You don't have to make big changes all at once. Start by adding one or two healthy foods to your meals each day.  Grains  Choose whole-grain breads and cereals and whole-wheat pasta and whole-grain  "crackers.  Vegetables  Eat a variety of vegetables every day. They have lots of nutrients and are part of a heart-healthy diet.  Fruits  Eat a variety of fruits every day. Fruits contain lots of nutrients. Choose fresh fruit instead of fruit juice.  Protein foods  Choose fish and lean poultry more often. Eat red meat and fried meats less often. Dried beans, tofu, and nuts are also good sources of protein.  Dairy  Choose low-fat or fat-free products from this food group. If you have problems digesting milk, try eating cheese or yogurt instead.  Fats and oils  Limit fats and oils if you're trying to cut calories. Choose healthy fats when you cook. These include canola oil and olive oil.  Where can you learn more?  Go to https://www.H5.net/patiented  Enter D676 in the search box to learn more about \"Learning About Dietary Guidelines.\"  Current as of: March 1, 2023               Content Version: 13.7    4980-9474 [employer].   Care instructions adapted under license by your healthcare professional. If you have questions about a medical condition or this instruction, always ask your healthcare professional. [employer] disclaims any warranty or liability for your use of this information.      Hearing Loss: Care Instructions  Overview     Hearing loss is a sudden or slow decrease in how well you hear. It can range from slight to profound. Permanent hearing loss can occur with aging. It also can happen when you are exposed long-term to loud noise. Examples include listening to loud music, riding motorcycles, or being around other loud machines.  Hearing loss can affect your work and home life. It can make you feel lonely or depressed. You may feel that you have lost your independence. But hearing aids and other devices can help you hear better and feel connected to others.  Follow-up care is a key part of your treatment and safety. Be sure to make and go to all appointments, and call your " doctor if you are having problems. It's also a good idea to know your test results and keep a list of the medicines you take.  How can you care for yourself at home?  Avoid loud noises whenever possible. This helps keep your hearing from getting worse.  Always wear hearing protection around loud noises.  Wear a hearing aid as directed.  A professional can help you pick a hearing aid that will work best for you.  You can also get hearing aids over the counter for mild to moderate hearing loss.  Have hearing tests as your doctor suggests. They can show whether your hearing has changed. Your hearing aid may need to be adjusted.  Use other devices as needed. These may include:  Telephone amplifiers and hearing aids that can connect to a television, stereo, radio, or microphone.  Devices that use lights or vibrations. These alert you to the doorbell, a ringing telephone, or a baby monitor.  Television closed-captioning. This shows the words at the bottom of the screen. Most new TVs can do this.  TTY (text telephone). This lets you type messages back and forth on the telephone instead of talking or listening. These devices are also called TDD. When messages are typed on the keyboard, they are sent over the phone line to a receiving TTY. The message is shown on a monitor.  Use text messaging, social media, and email if it is hard for you to communicate by telephone.  Try to learn a listening technique called speechreading. It is not lipreading. You pay attention to people's gestures, expressions, posture, and tone of voice. These clues can help you understand what a person is saying. Face the person you are talking to, and have them face you. Make sure the lighting is good. You need to see the other person's face clearly.  Think about counseling if you need help to adjust to your hearing loss.  When should you call for help?  Watch closely for changes in your health, and be sure to contact your doctor if:    You think your  "hearing is getting worse.     You have new symptoms, such as dizziness or nausea.   Where can you learn more?  Go to https://www.[employer].net/patiented  Enter R798 in the search box to learn more about \"Hearing Loss: Care Instructions.\"  Current as of: March 1, 2023               Content Version: 13.7    6645-1602 [employer].   Care instructions adapted under license by your healthcare professional. If you have questions about a medical condition or this instruction, always ask your healthcare professional. [employer] disclaims any warranty or liability for your use of this information.         "

## 2023-08-14 NOTE — ASSESSMENT & PLAN NOTE
Annual exam.  Colon cancer: UTD  Prostate cancer: UTD. Follows with urology.  Labs: Fasting lipids. Has not had baseline CMP/CBC in the past, so is amenable to obtaining these today.   BMI 26: Discussed diet and exercise. Does NOT have a sedentary lifestyle per report.  Immunizations: UTD. Consider COVID booster in the fall.   Regular eye exam. Regular dental care.  No falls. Cognitively intact.  AVS provided. Nancy is active.  Follow up in one year for MWV or sooner if indicated/needed.

## 2023-08-14 NOTE — LETTER
My Asthma Action Plan    Name: Juan Miguel Donovan   YOB: 1955  Date: 8/14/2023   My doctor: SHUBHAM Pham CNP   My clinic: [facility]        My Rescue Medicine:   Albuterol inhaler (Proair/Ventolin/Proventil HFA)  2-4 puffs EVERY 4 HOURS as needed. Use a spacer if recommended by your provider.   My Asthma Severity:   Intermittent / Exercise Induced  Know your asthma triggers: upper respiratory infections and cold air             GREEN ZONE   Good Control  I feel good  No cough or wheeze  Can work, sleep and play without asthma symptoms       Take your asthma control medicine every day.     If exercise triggers your asthma, take your rescue medication  15 minutes before exercise or sports, and  During exercise if you have asthma symptoms  Spacer to use with inhaler: If you have a spacer, make sure to use it with your inhaler             YELLOW ZONE Getting Worse  I have ANY of these:  I do not feel good  Cough or wheeze  Chest feels tight  Wake up at night   Keep taking your Green Zone medications  Start taking your rescue medicine:  every 20 minutes for up to 1 hour. Then every 4 hours for 24-48 hours.  If you stay in the Yellow Zone for more than 12-24 hours, contact your doctor.  If you do not return to the Green Zone in 12-24 hours or you get worse, start taking your oral steroid medicine if prescribed by your provider.           RED ZONE Medical Alert - Get Help  I have ANY of these:  I feel awful  Medicine is not helping  Breathing getting harder  Trouble walking or talking  Nose opens wide to breathe       Take your rescue medicine NOW  If your provider has prescribed an oral steroid medicine, start taking it NOW  Call your doctor NOW  If you are still in the Red Zone after 20 minutes and you have not reached your doctor:  Take your rescue medicine again and  Call 911 or go to the emergency room right away    See your regular doctor within 2 weeks of an Emergency  Room or Urgent Care visit for follow-up treatment.          Annual Reminders:  Meet with Asthma Educator,  Flu Shot in the Fall, consider Pneumonia Vaccination for patients with asthma (aged 19 and older).    Pharmacy: [id number] [place], [place] - [address] [facility], [place]    Electronically signed by SHUBHAM Pham CNP   Date: 08/14/23                    Asthma Triggers  How To Control Things That Make Your Asthma Worse    Triggers are things that make your asthma worse.  Look at the list below to help you find your triggers and   what you can do about them. You can help prevent asthma flare-ups by staying away from your triggers.      Trigger                                                          What you can do   Cigarette Smoke  Tobacco smoke can make asthma worse. Do not allow smoking in your home, car or around you.  Be sure no one smokes at a child s day care or school.  If you smoke, ask your health care provider for ways to help you quit.  Ask family members to quit too.  Ask your health care provider for a referral to Quit Plan to help you quit smoking, or call 6-240-237-PLAN.     Colds, Flu, Bronchitis  These are common triggers of asthma. Wash your hands often.  Don t touch your eyes, nose or mouth.  Get a flu shot every year.     Dust Mites  These are tiny bugs that live in cloth or carpet. They are too small to see. Wash sheets and blankets in hot water every week.   Encase pillows and mattress in dust mite proof covers.  Avoid having carpet if you can. If you have carpet, vacuum weekly.   Use a dust mask and HEPA vacuum.   Pollen and Outdoor Mold  Some people are allergic to trees, grass, or weed pollen, or molds. Try to keep your windows closed.  Limit time out doors when pollen count is high.   Ask you health care provider about taking medicine during allergy season.     Animal Dander  Some people are allergic to skin flakes, urine or saliva from pets with fur or feathers. Keep pets with  fur or feathers out of your home.    If you can t keep the pet outdoors, then keep the pet out of your bedroom.  Keep the bedroom door closed.  Keep pets off cloth furniture and away from stuffed toys.     Mice, Rats, and Cockroaches  Some people are allergic to the waste from these pests.   Cover food and garbage.  Clean up spills and food crumbs.  Store grease in the refrigerator.   Keep food out of the bedroom.   Indoor Mold  This can be a trigger if your home has high moisture. Fix leaking faucets, pipes, or other sources of water.   Clean moldy surfaces.  Dehumidify basement if it is damp and smelly.   Smoke, Strong Odors, and Sprays  These can reduce air quality. Stay away from strong odors and sprays, such as perfume, powder, hair spray, paints, smoke incense, paint, cleaning products, candles and new carpet.   Exercise or Sports  Some people with asthma have this trigger. Be active!  Ask your doctor about taking medicine before sports or exercise to prevent symptoms.    Warm up for 5-10 minutes before and after sports or exercise.     Other Triggers of Asthma  Cold air:  Cover your nose and mouth with a scarf.  Sometimes laughing or crying can be a trigger.  Some medicines and food can trigger asthma.

## 2023-08-14 NOTE — ASSESSMENT & PLAN NOTE
Current medications include albuterol inhaler as needed. Triggers include upper respiratory illness and cold weather primarily. Has not used albuterol in many months. Refill provided today and asthma action plan sent via [employer].

## 2023-08-14 NOTE — ASSESSMENT & PLAN NOTE
Follows with urology. Initially underwent MRI workup with a biopsy that resulted normal. His most recent PSA in March was elevated again to 8.4, therefore he had MRI imaging repeated. This was normal, therefore in consultation with his urologist he has elected for watchful waiting.

## 2023-08-21 ENCOUNTER — MYC MEDICAL ADVICE (OUTPATIENT)
Dept: FAMILY MEDICINE | Facility: CLINIC | Age: 68
End: 2023-08-21

## 2023-08-21 DIAGNOSIS — E78.00 HYPERCHOLESTEREMIA: Primary | ICD-10-CM

## 2023-08-21 RX ORDER — ATORVASTATIN CALCIUM 40 MG/1
40 TABLET, FILM COATED ORAL DAILY
Qty: 90 TABLET | Refills: 3 | Status: SHIPPED | OUTPATIENT
Start: 2023-08-21 | End: 2024-08-23

## 2023-09-22 ENCOUNTER — LAB (OUTPATIENT)
Dept: LAB | Facility: CLINIC | Age: 68
End: 2023-09-22
Payer: COMMERCIAL

## 2023-09-22 DIAGNOSIS — E78.00 HYPERCHOLESTEREMIA: ICD-10-CM

## 2023-09-22 LAB
CHOLEST SERPL-MCNC: 130 MG/DL
HDLC SERPL-MCNC: 39 MG/DL
LDLC SERPL CALC-MCNC: 71 MG/DL
NONHDLC SERPL-MCNC: 91 MG/DL
TRIGL SERPL-MCNC: 102 MG/DL

## 2023-09-22 PROCEDURE — 80061 LIPID PANEL: CPT

## 2023-09-22 PROCEDURE — 36415 COLL VENOUS BLD VENIPUNCTURE: CPT

## 2024-03-12 ENCOUNTER — OFFICE VISIT (OUTPATIENT)
Dept: DERMATOLOGY | Facility: CLINIC | Age: 69
End: 2024-03-12
Payer: COMMERCIAL

## 2024-03-12 DIAGNOSIS — L30.0 NUMMULAR ECZEMA: ICD-10-CM

## 2024-03-12 DIAGNOSIS — L82.1 SEBORRHEIC KERATOSIS: ICD-10-CM

## 2024-03-12 DIAGNOSIS — Z85.828 HISTORY OF NONMELANOMA SKIN CANCER: Primary | ICD-10-CM

## 2024-03-12 DIAGNOSIS — L57.0 AK (ACTINIC KERATOSIS): ICD-10-CM

## 2024-03-12 PROCEDURE — 99214 OFFICE O/P EST MOD 30 MIN: CPT | Mod: 25 | Performed by: DERMATOLOGY

## 2024-03-12 PROCEDURE — 17000 DESTRUCT PREMALG LESION: CPT | Performed by: DERMATOLOGY

## 2024-03-12 RX ORDER — CLOBETASOL PROPIONATE 0.5 MG/G
OINTMENT TOPICAL
Qty: 90 G | Refills: 3 | Status: SHIPPED | OUTPATIENT
Start: 2024-03-12

## 2024-03-12 ASSESSMENT — PAIN SCALES - GENERAL: PAINLEVEL: NO PAIN (0)

## 2024-03-12 NOTE — LETTER
3/12/2024       RE: Juan Miguel Donovan  [address] [place] [address]     Dear Colleague,    Thank you for referring your patient, Juan Miguel Donovan, to the [facility]MAT[facility] [place] at [facility]. Please see a copy of my visit note below.    [facility] Dermatology Note  Encounter Date: March 12, 2024     Dermatology Problem List:  1) Hx of NMSC  - nBCC, nasolabial fold s/p MMS 1/15/16  - nBCC, apical lip, s/p MMS 1/15/16  2) Hx of Melanoma in situ, left dorsal forearm, s/p excision 10 years ago   3 AKs  - s/p cryotherapy 4/5/18  - Has used efudex in the past  4) Nummular eczema   5) [place]'s disease        Assessment and Plan:  1.  History of melanoma in situ of the left arm approximately 10 to 12 years ago.  Clinically there is no evidence of recurrence today.  Reassurance was provided.  We discussed the ongoing importance of sun protective behaviors including high SPF sunscreens.  2.  History of basal cell carcinoma x2.  Similarly, there was no clinical evidence of recurrence today.  Reassurance was provided.  3.  Actinic keratoses x1, on the vertex scalp.  After informed verbal consent, each of these was treated with liquid nitrogen times 5 seconds x 2 cycles.  The patient tolerated this without complication.    4. Nummular eczema, predominantly affecting the hands.  Chronic active problem, controlled when he uses clobetasol consistently.  We refilled his prescription today to continue clobetasol ointment applied twice daily to affected areas.  5.  Benign findings including solar lentigines, seborrheic keratosis and benign appearing nevi.  Reassurance was provided as to the benign appearance of these lesions today.     He will follow up in our clinic in 1 years time, sooner for any new or changing lesions.        Otto Lazcano MD  Dermatology  Attending    _______________________________________________________________________     Chief Complaint: followup skin check, history of MIS     Subjective:  Juan Miguel is a very pleasant 68 year old male with a remote history of MIS of the left arm, s/p excision approx 12 years ago, also with history of 2 BCC.  Here today for followup skin check.  Last seen 2022, he had 4 AKs treated with cryotherapy with good improvement.  Today he reports a persistent small irritation on his right ear, thinks this is related to sleeping on this side.  Not highly bothersome.  Has recurring eczema on hands, generally good control with clobetasol ointment.     ROS: no recent fevers.     Objective:  GENERAL:  This is a well-appearing, well-nourished male with a normal mood and affect, who is oriented x3.  SKIN:  A cutaneous exam of the head, neck, chest, abdomen, back, bilateral upper and lower extremities and buttocks was performed.  On the face, there are 2 linear, well-healed scars without nodularity or erythema.  On the left forearm, there is a linear to ovoid whitish scar without pigment or nodularity.   On the vertex scalp there is a 3mm rough scaly pink flat topped papule.  Scattered on the dorsal hands, there are pink papules and thin plaques consistent with eczematous dermatitis.

## 2024-03-12 NOTE — NURSING NOTE
Dermatology Rooming Note    Juan Miguel Donovan's goals for this visit include:   Chief Complaint   Patient presents with    Skin Check     Juan Miguel is here today for a skin check - no concerns      SORIN oSto

## 2024-03-12 NOTE — PROGRESS NOTES
[facility] Dermatology Note  Encounter Date: March 12, 2024     Dermatology Problem List:  1) Hx of NMSC  - nBCC, nasolabial fold s/p MMS 1/15/16  - nBCC, apical lip, s/p MMS 1/15/16  2) Hx of Melanoma in situ, left dorsal forearm, s/p excision 10 years ago   3 AKs  - s/p cryotherapy 4/5/18  - Has used efudex in the past  4) Nummular eczema   5) [place]'s disease        Assessment and Plan:  1.  History of melanoma in situ of the left arm approximately 10 to 12 years ago.  Clinically there is no evidence of recurrence today.  Reassurance was provided.  We discussed the ongoing importance of sun protective behaviors including high SPF sunscreens.  2.  History of basal cell carcinoma x2.  Similarly, there was no clinical evidence of recurrence today.  Reassurance was provided.  3.  Actinic keratoses x1, on the vertex scalp.  After informed verbal consent, each of these was treated with liquid nitrogen times 5 seconds x 2 cycles.  The patient tolerated this without complication.    4. Nummular eczema, predominantly affecting the hands.  Chronic active problem, controlled when he uses clobetasol consistently.  We refilled his prescription today to continue clobetasol ointment applied twice daily to affected areas.  5.  Benign findings including solar lentigines, seborrheic keratosis and benign appearing nevi.  Reassurance was provided as to the benign appearance of these lesions today.     He will follow up in our clinic in 1 years time, sooner for any new or changing lesions.        Otto Lazcano MD  Dermatology Attending    _______________________________________________________________________     Chief Complaint: followup skin check, history of MIS     Subjective:  Juan Miguel is a very pleasant 68 year old male with a remote history of MIS of the left arm, s/p excision approx 12 years ago, also with history of 2 BCC.  Here today for followup skin check.  Last seen 2022, he had 4 AKs treated with  cryotherapy with good improvement.  Today he reports a persistent small irritation on his right ear, thinks this is related to sleeping on this side.  Not highly bothersome.  Has recurring eczema on hands, generally good control with clobetasol ointment.     ROS: no recent fevers.     Objective:  GENERAL:  This is a well-appearing, well-nourished male with a normal mood and affect, who is oriented x3.  SKIN:  A cutaneous exam of the head, neck, chest, abdomen, back, bilateral upper and lower extremities and buttocks was performed.  On the face, there are 2 linear, well-healed scars without nodularity or erythema.  On the left forearm, there is a linear to ovoid whitish scar without pigment or nodularity.   On the vertex scalp there is a 3mm rough scaly pink flat topped papule.  Scattered on the dorsal hands, there are pink papules and thin plaques consistent with eczematous dermatitis.

## 2024-04-03 ENCOUNTER — TELEPHONE (OUTPATIENT)
Dept: DERMATOLOGY | Facility: CLINIC | Age: 69
End: 2024-04-03
Payer: COMMERCIAL

## 2024-04-03 NOTE — TELEPHONE ENCOUNTER
Left Voicemail (1st Attempt) and Sent Mychart (1st Attempt) for the patient to call back and schedule the following:    Appointment type: Return dermatology  Provider: Dr. Lazcano  Return date: Approx. 4/2/2025  Specialty phone number: [phone]

## 2024-04-10 DIAGNOSIS — R97.20 ELEVATED PROSTATE SPECIFIC ANTIGEN (PSA): Primary | ICD-10-CM

## 2024-04-26 ENCOUNTER — OFFICE VISIT (OUTPATIENT)
Dept: UROLOGY | Facility: CLINIC | Age: 69
End: 2024-04-26
Payer: COMMERCIAL

## 2024-04-26 VITALS
HEART RATE: 50 BPM | WEIGHT: 200 LBS | OXYGEN SATURATION: 99 % | BODY MASS INDEX: 26.51 KG/M2 | SYSTOLIC BLOOD PRESSURE: 129 MMHG | DIASTOLIC BLOOD PRESSURE: 80 MMHG | HEIGHT: 73 IN

## 2024-04-26 DIAGNOSIS — R97.20 ELEVATED PROSTATE SPECIFIC ANTIGEN (PSA): ICD-10-CM

## 2024-04-26 LAB — PSA SERPL-MCNC: 5 UG/L (ref 0–4)

## 2024-04-26 PROCEDURE — 36415 COLL VENOUS BLD VENIPUNCTURE: CPT | Performed by: STUDENT IN AN ORGANIZED HEALTH CARE EDUCATION/TRAINING PROGRAM

## 2024-04-26 PROCEDURE — 84153 ASSAY OF PSA TOTAL: CPT | Performed by: STUDENT IN AN ORGANIZED HEALTH CARE EDUCATION/TRAINING PROGRAM

## 2024-04-26 PROCEDURE — 99213 OFFICE O/P EST LOW 20 MIN: CPT | Performed by: STUDENT IN AN ORGANIZED HEALTH CARE EDUCATION/TRAINING PROGRAM

## 2024-04-26 ASSESSMENT — PAIN SCALES - GENERAL: PAINLEVEL: NO PAIN (0)

## 2024-04-26 NOTE — NURSING NOTE
Chief Complaint   Patient presents with    Elevated PSA     Here in clinic for a psa draw today     Talk about the psa   Everything is going well   Corrine Infante, CMA

## 2024-04-26 NOTE — LETTER
"4/26/2024       RE: Juan Miguel Donovan  [address] [place] [address]     Dear Colleague,    Thank you for referring your patient, Juan Miguel Donovan, to the [facility]OLOG[facility] [place] at [facility]. Please see a copy of my visit note below.    CHIEF COMPLAINT   It was my pleasure to see Juan Miguel Donovan who is a 68 year old male for follow-up of evaded PSA.      HPI:  Juan Miguel Donovan is a 68 year old male being seen for follow-up.  Duration of problem: 3 years  Previous treatments: On follow-up and surveillance      Reviewed previous notes  No recent worsening of lower urinary tract symptoms  PSA has remained stable in fact decreased from the last value  Current PSA stands at 5    Exam:  /80   Pulse 50   Ht 1.854 m (6' 1\")   Wt 90.7 kg (200 lb)   SpO2 99%   BMI 26.39 kg/m    General: age-appropriate appearing male in NAD sitting in an exam chair  Resp: no respiratory distress  CV: heart rate regular  Abdomen: Degree of obesity is mild. Abdomen is soft and nontender. No organomegaly.   : not performed  Neuro: grossly non focal. Normal reflexes  Motor: excellent strength throughout    Review of Imaging:  The following imaging exams were independently viewed and interpreted by me and discussed with patient:      Review of Labs:  The following labs were reviewed by me and discussed with the patient:  PSA: Abnormal: 5 ng/mL    Assessment & Plan    Elevated prostate specific antigen (PSA)  PSA continues to be at a stable level right now  It has in fact, decreased from the previous value  I would recommend further PSA follow-up with primary care  PSA screening can be continued until age 70 and stop  Follow-up with me as needed  - PSA tumor marker      Michoacano Batista MD  [facility]      ==========================    Additional Billing and Coding Information:  Review of external notes as documented above   Review of the " result(s) of each unique test - PSA                12 minutes spent by me on the date of the encounter doing chart review, review of test results, interpretation of tests, patient visit, and documentation     ==========================

## 2024-04-26 NOTE — PROGRESS NOTES
"CHIEF COMPLAINT   It was my pleasure to see Juan Miguel Donovan who is a 68 year old male for follow-up of evaded PSA.      HPI:  Juan Miguel Donovan is a 68 year old male being seen for follow-up.  Duration of problem: 3 years  Previous treatments: On follow-up and surveillance      Reviewed previous notes  No recent worsening of lower urinary tract symptoms  PSA has remained stable in fact decreased from the last value  Current PSA stands at 5    Exam:  /80   Pulse 50   Ht 1.854 m (6' 1\")   Wt 90.7 kg (200 lb)   SpO2 99%   BMI 26.39 kg/m    General: age-appropriate appearing male in NAD sitting in an exam chair  Resp: no respiratory distress  CV: heart rate regular  Abdomen: Degree of obesity is mild. Abdomen is soft and nontender. No organomegaly.   : not performed  Neuro: grossly non focal. Normal reflexes  Motor: excellent strength throughout    Review of Imaging:  The following imaging exams were independently viewed and interpreted by me and discussed with patient:      Review of Labs:  The following labs were reviewed by me and discussed with the patient:  PSA: Abnormal: 5 ng/mL    Assessment & Plan     Elevated prostate specific antigen (PSA)  PSA continues to be at a stable level right now  It has in fact, decreased from the previous value  I would recommend further PSA follow-up with primary care  PSA screening can be continued until age 70 and stop  Follow-up with me as needed  - PSA tumor marker      Michoacano Batista MD  [facility]      ==========================    Additional Billing and Coding Information:  Review of external notes as documented above   Review of the result(s) of each unique test - PSA                12 minutes spent by me on the date of the encounter doing chart review, review of test results, interpretation of tests, patient visit, and documentation     ==========================  "

## 2024-08-19 SDOH — HEALTH STABILITY: PHYSICAL HEALTH: ON AVERAGE, HOW MANY DAYS PER WEEK DO YOU ENGAGE IN MODERATE TO STRENUOUS EXERCISE (LIKE A BRISK WALK)?: 4 DAYS

## 2024-08-19 SDOH — HEALTH STABILITY: PHYSICAL HEALTH: ON AVERAGE, HOW MANY MINUTES DO YOU ENGAGE IN EXERCISE AT THIS LEVEL?: 60 MIN

## 2024-08-19 ASSESSMENT — ASTHMA QUESTIONNAIRES
ACT_TOTALSCORE: 25
QUESTION_3 LAST FOUR WEEKS HOW OFTEN DID YOUR ASTHMA SYMPTOMS (WHEEZING, COUGHING, SHORTNESS OF BREATH, CHEST TIGHTNESS OR PAIN) WAKE YOU UP AT NIGHT OR EARLIER THAN USUAL IN THE MORNING: NOT AT ALL
QUESTION_5 LAST FOUR WEEKS HOW WOULD YOU RATE YOUR ASTHMA CONTROL: COMPLETELY CONTROLLED
ACT_TOTALSCORE: 25
QUESTION_1 LAST FOUR WEEKS HOW MUCH OF THE TIME DID YOUR ASTHMA KEEP YOU FROM GETTING AS MUCH DONE AT WORK, SCHOOL OR AT HOME: NONE OF THE TIME
QUESTION_2 LAST FOUR WEEKS HOW OFTEN HAVE YOU HAD SHORTNESS OF BREATH: NOT AT ALL
QUESTION_4 LAST FOUR WEEKS HOW OFTEN HAVE YOU USED YOUR RESCUE INHALER OR NEBULIZER MEDICATION (SUCH AS ALBUTEROL): NOT AT ALL

## 2024-08-19 ASSESSMENT — SOCIAL DETERMINANTS OF HEALTH (SDOH): HOW OFTEN DO YOU GET TOGETHER WITH FRIENDS OR RELATIVES?: THREE TIMES A WEEK

## 2024-08-23 ENCOUNTER — OFFICE VISIT (OUTPATIENT)
Dept: FAMILY MEDICINE | Facility: CLINIC | Age: 69
End: 2024-08-23
Payer: COMMERCIAL

## 2024-08-23 VITALS
TEMPERATURE: 98.5 F | BODY MASS INDEX: 26.86 KG/M2 | HEART RATE: 60 BPM | OXYGEN SATURATION: 100 % | SYSTOLIC BLOOD PRESSURE: 127 MMHG | WEIGHT: 202.7 LBS | DIASTOLIC BLOOD PRESSURE: 76 MMHG | HEIGHT: 73 IN | RESPIRATION RATE: 16 BRPM

## 2024-08-23 DIAGNOSIS — E78.00 HYPERCHOLESTEREMIA: ICD-10-CM

## 2024-08-23 DIAGNOSIS — J45.21 MILD INTERMITTENT ASTHMA WITH EXACERBATION: ICD-10-CM

## 2024-08-23 DIAGNOSIS — C43.9 MALIGNANT MELANOMA, UNSPECIFIED SITE (H): ICD-10-CM

## 2024-08-23 DIAGNOSIS — R79.89 ELEVATED TSH: ICD-10-CM

## 2024-08-23 DIAGNOSIS — R97.20 ELEVATED PROSTATE SPECIFIC ANTIGEN (PSA): ICD-10-CM

## 2024-08-23 DIAGNOSIS — M25.512 ACUTE PAIN OF LEFT SHOULDER: ICD-10-CM

## 2024-08-23 DIAGNOSIS — Z13.228 SCREENING FOR METABOLIC DISORDER: ICD-10-CM

## 2024-08-23 DIAGNOSIS — Z00.00 ROUTINE GENERAL MEDICAL EXAMINATION AT A HEALTH CARE FACILITY: Primary | ICD-10-CM

## 2024-08-23 DIAGNOSIS — R73.03 PREDIABETES: ICD-10-CM

## 2024-08-23 DIAGNOSIS — R20.2 PARESTHESIA OF BOTH FEET: ICD-10-CM

## 2024-08-23 LAB
ALBUMIN SERPL BCG-MCNC: 4.3 G/DL (ref 3.5–5.2)
ALP SERPL-CCNC: 92 U/L (ref 40–150)
ALT SERPL W P-5'-P-CCNC: 43 U/L (ref 0–70)
ANION GAP SERPL CALCULATED.3IONS-SCNC: 10 MMOL/L (ref 7–15)
AST SERPL W P-5'-P-CCNC: 38 U/L (ref 0–45)
BILIRUB SERPL-MCNC: 0.6 MG/DL
BUN SERPL-MCNC: 19.2 MG/DL (ref 8–23)
CALCIUM SERPL-MCNC: 9.5 MG/DL (ref 8.8–10.4)
CHLORIDE SERPL-SCNC: 105 MMOL/L (ref 98–107)
CHOLEST SERPL-MCNC: 127 MG/DL
CREAT SERPL-MCNC: 1.3 MG/DL (ref 0.67–1.17)
EGFRCR SERPLBLD CKD-EPI 2021: 60 ML/MIN/1.73M2
FASTING STATUS PATIENT QL REPORTED: YES
FASTING STATUS PATIENT QL REPORTED: YES
GLUCOSE SERPL-MCNC: 105 MG/DL (ref 70–99)
HBA1C MFR BLD: 5.7 % (ref 0–5.6)
HCO3 SERPL-SCNC: 25 MMOL/L (ref 22–29)
HDLC SERPL-MCNC: 44 MG/DL
LDLC SERPL CALC-MCNC: 72 MG/DL
NONHDLC SERPL-MCNC: 83 MG/DL
POTASSIUM SERPL-SCNC: 5.8 MMOL/L (ref 3.4–5.3)
PROT SERPL-MCNC: 6.9 G/DL (ref 6.4–8.3)
SODIUM SERPL-SCNC: 140 MMOL/L (ref 135–145)
TRIGL SERPL-MCNC: 54 MG/DL

## 2024-08-23 PROCEDURE — 80053 COMPREHEN METABOLIC PANEL: CPT

## 2024-08-23 PROCEDURE — 83036 HEMOGLOBIN GLYCOSYLATED A1C: CPT

## 2024-08-23 PROCEDURE — 99214 OFFICE O/P EST MOD 30 MIN: CPT | Mod: 25

## 2024-08-23 PROCEDURE — 99397 PER PM REEVAL EST PAT 65+ YR: CPT

## 2024-08-23 PROCEDURE — 36415 COLL VENOUS BLD VENIPUNCTURE: CPT

## 2024-08-23 PROCEDURE — 80061 LIPID PANEL: CPT

## 2024-08-23 RX ORDER — ATORVASTATIN CALCIUM 40 MG/1
40 TABLET, FILM COATED ORAL DAILY
Qty: 90 TABLET | Refills: 3 | Status: SHIPPED | OUTPATIENT
Start: 2024-08-23

## 2024-08-23 NOTE — PROGRESS NOTES
Preventive Care Visit  [facility]  SHUBHAM Pham CNP, Family Medicine  Aug 23, 2024      Assessment & Plan   Problem List Items Addressed This Visit       Melanoma (H)     Follows regularly with dermatology for many skin concerns including history of melanoma, BCC, AK, SK, etc. He notes he is up to date on these visits.          Paresthesia of both feet     Unchanged and not overly bothersome. No additional intervention desired.          Elevated prostate specific antigen (PSA)     Patient has been seen by urology regarding elevations in his PSA. Has had a negative biopsy as part of his original workup. He has undergone recent repeat MRI imaging last year due to a bump in the PSA which was unchanged. He notes today that his PSA then decreased and urology is recommending return to primary care. He will need an updated PSA next year or with any change in symptoms and can return to urology as needed.         Elevated TSH     Denies symptoms of thyroid dysfunction and therefore declines updated labs given recent TSH 12 months ago within normal range.          Mild intermittent asthma with exacerbation     Well controlled with essentially no use of albuterol rescue inhaler. Triggers tend to be exercise or spring allergens. Denies the need for refill today; asthma action plan sent via [employer].          Routine general medical examination at a health care facility - Primary     Annual exam.  PSA: UTD. Due next year.   Colonoscopy: UTD  Immunizations: UTD. Discussed seasonal immunizations.  Labs: Discussed and offered.  Mood: Stable. No concerns.  BMI: 26.74. Discussed sedentary lifestyle risks - patient is active but without structured exercise. Also discussed adding on strength training.   Recommend follow up in one year for annual exam or sooner if needed/indicated elsewhere.           Prediabetes     Stable with previous measurements. Updated A1C today.          Relevant Orders     "Hemoglobin A1c (Completed)    Left shoulder pain     Improving at the time of our visit today. Mechanism of injury and physical exam seem consistent with rotator cuff injury although discussed given improving symptoms I think a significant tear is less likely. He will do some specific physical therapy exercises that he was given by a family member diagnosed with this as well and let me know in the coming weeks if recovery seems to have plateaued. At that time, consider formal referral to physical therapy and/or sports medicine.           Other Visit Diagnoses       Hypercholesteremia        Relevant Medications    atorvastatin (LIPITOR) 40 MG tablet    Other Relevant Orders    Lipid panel reflex to direct LDL Fasting    Screening for metabolic disorder        Relevant Orders    Comprehensive metabolic panel (BMP + Alb, Alk Phos, ALT, AST, Total. Bili, TP)           Patient has been advised of split billing requirements and indicates understanding: Yes     BMI  Estimated body mass index is 26.74 kg/m  as calculated from the following:    Height as of this encounter: 1.854 m (6' 1\").    Weight as of this encounter: 91.9 kg (202 lb 11.2 oz).     Counseling  Appropriate preventive services were addressed with this patient via screening, questionnaire, or discussion as appropriate for fall prevention, nutrition, physical activity, Tobacco-use cessation, social engagement, weight loss and cognition.  Checklist reviewing preventive services available has been given to the patient.  Reviewed patient's diet, addressing concerns and/or questions.     Hoang Bullard is a 69 year old, presenting for the following:  Physical and Shoulder Pain (Left nki)        8/23/2024     7:38 AM   Additional Questions   Roomed by ac   Accompanied by self        Health Care Directive  Patient has a Health Care Directive on file  Advance care planning document is on file and is current.    Patient is a very pleasant 69-year-old presented " today for his annual physical.  In addition to preventative medicine, he is hoping to discuss some left shoulder discomfort. Started in July with some golfing. The shoulder was painful for a period of time, but the pain has overall resolved.  Still has some pulling and tight sensations while moving the left arm in certain ways but no radicular symptoms or weakness.     Shoulder Pain          8/19/2024   General Health   How would you rate your overall physical health? Excellent   Feel stress (tense, anxious, or unable to sleep) Not at all            8/19/2024   Nutrition   Diet: Regular (no restrictions)      Well rounded diet for the most part. Home cooking.         8/19/2024   Exercise   Days per week of moderate/strenous exercise 4 days   Average minutes spent exercising at this level 60 min      Golf. Remains quite active outdoors.         8/19/2024   Social Factors   Frequency of gathering with friends or relatives Three times a week   Worry food won't last until get money to buy more No   Food not last or not have enough money for food? No   Do you have housing? (Housing is defined as stable permanent housing and does not include staying ouside in a car, in a tent, in an abandoned building, in an overnight shelter, or couch-surfing.) Yes   Are you worried about losing your housing? No   Lack of transportation? No   Unable to get utilities (heat,electricity)? No          8/19/2024   Fall Risk   Fallen 2 or more times in the past year? No    No   Trouble with walking or balance? No    No       Multiple values from one day are sorted in reverse-chronological order          8/19/2024   Activities of Daily Living- Home Safety   Needs help with the following daily activites None of the above   Safety concerns in the home None of the above          8/19/2024   Dental   Dentist two times every year? Yes          8/19/2024   Hearing Screening   Hearing concerns? None of the above          8/19/2024   Driving Risk  Screening   Patient/family members have concerns about driving No            8/19/2024   General Alertness/Fatigue Screening   Have you been more tired than usual lately? No            8/19/2024   Urinary Incontinence Screening   Bothered by leaking urine in past 6 months No            8/19/2024   TB Screening   Were you born outside of the [place]? No      Today's PHQ-2 Score:       8/22/2024    10:15 AM   PHQ-2 ( 1999 Pf[employer])   Q1: Little interest or pleasure in doing things 0   Q2: Feeling down, depressed or hopeless 0   PHQ-2 Score 0   Q1: Little interest or pleasure in doing things Not at all   Q2: Feeling down, depressed or hopeless Not at all   PHQ-2 Score 0         8/19/2024   Substance Use   Alcohol more than 3/day or more than 7/wk No   Do you have a current opioid prescription? No   How severe/bad is pain from 1 to 10? 0/10 (No Pain)   Do you use any other substances recreationally? No        Social History     Tobacco Use    Smoking status: Never    Smokeless tobacco: Never   Vaping Use    Vaping status: Never Used   Substance Use Topics    Alcohol use: Yes     Comment: socially, CAGE-neg    Drug use: No         8/19/2024   AAA Screening   Family history of Abdominal Aortic Aneurysm (AAA)? No      Last PSA:   PSA   Date Value Ref Range Status   05/20/2021 5.78 (H) 0 - 4 ug/L Final     Comment:     Assay Method:  Chemiluminescence using Siemens Vista analyzer     Prostate Specific Antigen Screen   Date Value Ref Range Status   05/04/2022 5.75 (H) 0.00 - 4.00 ug/L Final     PSA Tumor Marker   Date Value Ref Range Status   04/26/2024 5.00 (H) 0.00 - 4.00 ug/L Final     ASCVD Risk   The 10-year ASCVD risk score (Beth AUGUSTINE, et al., 2019) is: 13.8%    Values used to calculate the score:      Age: 69 years      Sex: Male      Is Non- : No      Diabetic: No      Tobacco smoker: No      Systolic Blood Pressure: 127 mmHg      Is BP treated: No      HDL Cholesterol: 39 mg/dL      Total  "Cholesterol: 130 mg/dL    Reviewed and updated as needed this visit by Provider                  Current providers sharing in care for this patient include:  Patient Care Team:  Maribel Murrieta APRN CNP as PCP - General (Family Medicine)  Ching Beckham MD as MD (Dermatology)  Otto Lazcano MD as MD (Dermapathology)  Maribel Murrieta APRN CNP as Assigned PCP  Michoacano Batista MD as Assigned Surgical Provider    The following health maintenance items are reviewed in Epic and correct as of today:  Health Maintenance   Topic Date Due    RSV VACCINE (Pregnancy & 60+) (1 - 1-dose 60+ series) Never done    COVID-19 Vaccine (7 - 2023-24 season) 02/17/2024    LIPID  09/22/2024    INFLUENZA VACCINE (1) 09/01/2024    ASTHMA CONTROL TEST  02/23/2025    MEDICARE ANNUAL WELLNESS VISIT  08/23/2025    ANNUAL REVIEW OF HM ORDERS  08/23/2025    ASTHMA ACTION PLAN  08/23/2025    FALL RISK ASSESSMENT  08/23/2025    GLUCOSE  08/14/2026    DTAP/TDAP/TD IMMUNIZATION (3 - Td or Tdap) 10/06/2027    ADVANCE CARE PLANNING  08/23/2029    COLORECTAL CANCER SCREENING  06/14/2032    HEPATITIS C SCREENING  Completed    PHQ-2 (once per calendar year)  Completed    Pneumococcal Vaccine: 65+ Years  Completed    ZOSTER IMMUNIZATION  Completed    HPV IMMUNIZATION  Aged Out    MENINGITIS IMMUNIZATION  Aged Out    RSV MONOCLONAL ANTIBODY  Aged Out        Objective    Exam  /76 (BP Location: Left arm, Patient Position: Sitting, Cuff Size: Adult Large)   Pulse 60   Temp 98.5  F (36.9  C) (Oral)   Resp 16   Ht 1.854 m (6' 1\")   Wt 91.9 kg (202 lb 11.2 oz)   SpO2 100%   BMI 26.74 kg/m     Estimated body mass index is 26.74 kg/m  as calculated from the following:    Height as of this encounter: 1.854 m (6' 1\").    Weight as of this encounter: 91.9 kg (202 lb 11.2 oz).    Physical Exam  GENERAL: alert and no distress  EYES: Eyes grossly normal to inspection, PERRL and conjunctivae and sclerae normal  HENT: ear canals " and TM's normal, nose and mouth without ulcers or lesions  NECK: no adenopathy, no asymmetry, masses, or scars  RESP: lungs clear to auscultation - no rales, rhonchi or wheezes  CV: regular rate and rhythm, normal S1 S2, no S3 or S4, no murmur, click or rub, no peripheral edema  ABDOMEN: soft, nontender, no hepatosplenomegaly, no masses and bowel sounds normal  MS: no gross musculoskeletal defects noted, no edema. Left shoulder: mild tenderness to palpation over the supraspinatus muscle and anterior shoulder. Good range of motion both passive and active without crepitus. Positive scratch test, negative Empty Can.  SKIN: no suspicious lesions or rashes  NEURO: Normal strength and tone, mentation intact and speech normal  PSYCH: mentation appears normal, affect normal/bright        8/23/2024   Mini Cog   Clock Draw Score 2 Normal   3 Item Recall 3 objects recalled   Mini Cog Total Score 5              Signed Electronically by: SHUBHAM Pham CNP

## 2024-08-23 NOTE — ASSESSMENT & PLAN NOTE
Patient has been seen by urology regarding elevations in his PSA. Has had a negative biopsy as part of his original workup. He has undergone recent repeat MRI imaging last year due to a bump in the PSA which was unchanged. He notes today that his PSA then decreased and urology is recommending return to primary care. He will need an updated PSA next year or with any change in symptoms and can return to urology as needed.

## 2024-08-23 NOTE — ASSESSMENT & PLAN NOTE
Improving at the time of our visit today. Mechanism of injury and physical exam seem consistent with rotator cuff injury although discussed given improving symptoms I think a significant tear is less likely. He will do some specific physical therapy exercises that he was given by a family member diagnosed with this as well and let me know in the coming weeks if recovery seems to have plateaued. At that time, consider formal referral to physical therapy and/or sports medicine.

## 2024-08-23 NOTE — ASSESSMENT & PLAN NOTE
Follows regularly with dermatology for many skin concerns including history of melanoma, BCC, AK, SK, etc. He notes he is up to date on these visits.

## 2024-08-23 NOTE — ASSESSMENT & PLAN NOTE
Annual exam.  PSA: UTD. Due next year.   Colonoscopy: UTD  Immunizations: UTD. Discussed seasonal immunizations.  Labs: Discussed and offered.  Mood: Stable. No concerns.  BMI: 26.74. Discussed sedentary lifestyle risks - patient is active but without structured exercise. Also discussed adding on strength training.   Recommend follow up in one year for annual exam or sooner if needed/indicated elsewhere.

## 2024-08-23 NOTE — ASSESSMENT & PLAN NOTE
Denies symptoms of thyroid dysfunction and therefore declines updated labs given recent TSH 12 months ago within normal range.

## 2024-08-23 NOTE — ASSESSMENT & PLAN NOTE
Well controlled with essentially no use of albuterol rescue inhaler. Triggers tend to be exercise or spring allergens. Denies the need for refill today; asthma action plan sent via [employer].

## 2024-08-23 NOTE — LETTER
My Asthma Action Plan    Name: Juan Miguel Donovan   YOB: 1955  Date: 8/23/2024   My doctor: SHUBHAM Pham CNP   My clinic: [facility]        My Rescue Medicine:   Albuterol inhaler (Proair/Ventolin/Proventil HFA)  2-4 puffs EVERY 4 HOURS as needed. Use a spacer if recommended by your provider.   My Asthma Severity:   Intermittent / Exercise Induced  Know your asthma triggers: pollens and exercise or sports             GREEN ZONE   Good Control  I feel good  No cough or wheeze  Can work, sleep and play without asthma symptoms       Take your asthma control medicine every day.     If exercise triggers your asthma, take your rescue medication  15 minutes before exercise or sports, and  During exercise if you have asthma symptoms  Spacer to use with inhaler: If you have a spacer, make sure to use it with your inhaler             YELLOW ZONE Getting Worse  I have ANY of these:  I do not feel good  Cough or wheeze  Chest feels tight  Wake up at night   Keep taking your Green Zone medications  Start taking your rescue medicine:  every 20 minutes for up to 1 hour. Then every 4 hours for 24-48 hours.  If you stay in the Yellow Zone for more than 12-24 hours, contact your doctor.  If you do not return to the Green Zone in 12-24 hours or you get worse, start taking your oral steroid medicine if prescribed by your provider.           RED ZONE Medical Alert - Get Help  I have ANY of these:  I feel awful  Medicine is not helping  Breathing getting harder  Trouble walking or talking  Nose opens wide to breathe       Take your rescue medicine NOW  If your provider has prescribed an oral steroid medicine, start taking it NOW  Call your doctor NOW  If you are still in the Red Zone after 20 minutes and you have not reached your doctor:  Take your rescue medicine again and  Call 911 or go to the emergency room right away    See your regular doctor within 2 weeks of an Emergency Room or  Urgent Care visit for follow-up treatment.          Annual Reminders:  Meet with Asthma Educator,  Flu Shot in the Fall, consider Pneumonia Vaccination for patients with asthma (aged 19 and older).    Pharmacy: [id number] [place], [place] [address]    Electronically signed by SHUBHAM Pham CNP   Date: 08/23/24                    Asthma Triggers  How To Control Things That Make Your Asthma Worse    Triggers are things that make your asthma worse.  Look at the list below to help you find your triggers and   what you can do about them. You can help prevent asthma flare-ups by staying away from your triggers.      Trigger                                                          What you can do   Cigarette Smoke  Tobacco smoke can make asthma worse. Do not allow smoking in your home, car or around you.  Be sure no one smokes at a child s day care or school.  If you smoke, ask your health care provider for ways to help you quit.  Ask family members to quit too.  Ask your health care provider for a referral to Quit Plan to help you quit smoking, or call 1-975-690-PLAN.     Colds, Flu, Bronchitis  These are common triggers of asthma. Wash your hands often.  Don t touch your eyes, nose or mouth.  Get a flu shot every year.     Dust Mites  These are tiny bugs that live in cloth or carpet. They are too small to see. Wash sheets and blankets in hot water every week.   Encase pillows and mattress in dust mite proof covers.  Avoid having carpet if you can. If you have carpet, vacuum weekly.   Use a dust mask and HEPA vacuum.   Pollen and Outdoor Mold  Some people are allergic to trees, grass, or weed pollen, or molds. Try to keep your windows closed.  Limit time out doors when pollen count is high.   Ask you health care provider about taking medicine during allergy season.     Animal Dander  Some people are allergic to skin flakes, urine or saliva from pets with fur or feathers. Keep pets with fur or  feathers out of your home.    If you can t keep the pet outdoors, then keep the pet out of your bedroom.  Keep the bedroom door closed.  Keep pets off cloth furniture and away from stuffed toys.     Mice, Rats, and Cockroaches  Some people are allergic to the waste from these pests.   Cover food and garbage.  Clean up spills and food crumbs.  Store grease in the refrigerator.   Keep food out of the bedroom.   Indoor Mold  This can be a trigger if your home has high moisture. Fix leaking faucets, pipes, or other sources of water.   Clean moldy surfaces.  Dehumidify basement if it is damp and smelly.   Smoke, Strong Odors, and Sprays  These can reduce air quality. Stay away from strong odors and sprays, such as perfume, powder, hair spray, paints, smoke incense, paint, cleaning products, candles and new carpet.   Exercise or Sports  Some people with asthma have this trigger. Be active!  Ask your doctor about taking medicine before sports or exercise to prevent symptoms.    Warm up for 5-10 minutes before and after sports or exercise.     Other Triggers of Asthma  Cold air:  Cover your nose and mouth with a scarf.  Sometimes laughing or crying can be a trigger.  Some medicines and food can trigger asthma.

## 2024-08-23 NOTE — PATIENT INSTRUCTIONS
Patient Education   Preventive Care Advice   This is general advice given by our system to help you stay healthy. However, your care team may have specific advice just for you. Please talk to your care team about your preventive care needs.  Nutrition  Eat 5 or more servings of fruits and vegetables each day.  Try wheat bread, brown rice and whole grain pasta (instead of white bread, rice, and pasta).  Get enough calcium and vitamin D. Check the label on foods and aim for 100% of the RDA (recommended daily allowance).  Lifestyle  Exercise at least 150 minutes each week  (30 minutes a day, 5 days a week).  Do muscle strengthening activities 2 days a week. These help control your weight and prevent disease.  No smoking.  Wear sunscreen to prevent skin cancer.  Have a dental exam and cleaning every 6 months.  Yearly exams  See your health care team every year to talk about:  Any changes in your health.  Any medicines your care team has prescribed.  Preventive care, family planning, and ways to prevent chronic diseases.  Shots (vaccines)   HPV shots (up to age 26), if you've never had them before.  Hepatitis B shots (up to age 59), if you've never had them before.  COVID-19 shot: Get this shot when it's due.  Flu shot: Get a flu shot every year.  Tetanus shot: Get a tetanus shot every 10 years.  Pneumococcal, hepatitis A, and RSV shots: Ask your care team if you need these based on your risk.  Shingles shot (for age 50 and up)  General health tests  Diabetes screening:  Starting at age 35, Get screened for diabetes at least every 3 years.  If you are younger than age 35, ask your care team if you should be screened for diabetes.  Cholesterol test: At age 39, start having a cholesterol test every 5 years, or more often if advised.  Bone density scan (DEXA): At age 50, ask your care team if you should have this scan for osteoporosis (brittle bones).  Hepatitis C: Get tested at least once in your life.  STIs (sexually  transmitted infections)  Before age 24: Ask your care team if you should be screened for STIs.  After age 24: Get screened for STIs if you're at risk. You are at risk for STIs (including HIV) if:  You are sexually active with more than one person.  You don't use condoms every time.  You or a partner was diagnosed with a sexually transmitted infection.  If you are at risk for HIV, ask about PrEP medicine to prevent HIV.  Get tested for HIV at least once in your life, whether you are at risk for HIV or not.  Cancer screening tests  Cervical cancer screening: If you have a cervix, begin getting regular cervical cancer screening tests starting at age 21.  Breast cancer scan (mammogram): If you've ever had breasts, begin having regular mammograms starting at age 40. This is a scan to check for breast cancer.  Colon cancer screening: It is important to start screening for colon cancer at age 45.  Have a colonoscopy test every 10 years (or more often if you're at risk) Or, ask your provider about stool tests like a FIT test every year or Cologuard test every 3 years.  To learn more about your testing options, visit:   .  For help making a decision, visit:   https://bit.ly/cz57541.  Prostate cancer screening test: If you have a prostate, ask your care team if a prostate cancer screening test (PSA) at age 55 is right for you.  Lung cancer screening: If you are a current or former smoker ages 50 to 80, ask your care team if ongoing lung cancer screenings are right for you.  For informational purposes only. Not to replace the advice of your health care provider. Copyright   2023 [employer]. All rights reserved. Clinically reviewed by the [facility]. Prezi [id number] - REV 01/24.

## 2024-08-26 DIAGNOSIS — E87.5 HYPERKALEMIA: Primary | ICD-10-CM

## 2024-09-20 ENCOUNTER — LAB (OUTPATIENT)
Dept: LAB | Facility: CLINIC | Age: 69
End: 2024-09-20
Payer: COMMERCIAL

## 2024-09-20 DIAGNOSIS — E87.5 HYPERKALEMIA: ICD-10-CM

## 2024-09-20 LAB
ANION GAP SERPL CALCULATED.3IONS-SCNC: 11 MMOL/L (ref 7–15)
BUN SERPL-MCNC: 19.9 MG/DL (ref 8–23)
CALCIUM SERPL-MCNC: 8.8 MG/DL (ref 8.8–10.4)
CHLORIDE SERPL-SCNC: 103 MMOL/L (ref 98–107)
CREAT SERPL-MCNC: 1.31 MG/DL (ref 0.67–1.17)
EGFRCR SERPLBLD CKD-EPI 2021: 59 ML/MIN/1.73M2
GLUCOSE SERPL-MCNC: 98 MG/DL (ref 70–99)
HCO3 SERPL-SCNC: 24 MMOL/L (ref 22–29)
MAGNESIUM SERPL-MCNC: 2 MG/DL (ref 1.7–2.3)
POTASSIUM SERPL-SCNC: 4.6 MMOL/L (ref 3.4–5.3)
SODIUM SERPL-SCNC: 138 MMOL/L (ref 135–145)

## 2024-09-20 PROCEDURE — 36415 COLL VENOUS BLD VENIPUNCTURE: CPT

## 2024-09-20 PROCEDURE — 80048 BASIC METABOLIC PNL TOTAL CA: CPT

## 2024-09-20 PROCEDURE — 83735 ASSAY OF MAGNESIUM: CPT

## 2024-09-23 PROBLEM — N18.31 STAGE 3A CHRONIC KIDNEY DISEASE (H): Status: ACTIVE | Noted: 2024-09-23

## 2025-03-21 ENCOUNTER — MYC REFILL (OUTPATIENT)
Dept: FAMILY MEDICINE | Facility: CLINIC | Age: 70
End: 2025-03-21
Payer: COMMERCIAL

## 2025-03-21 DIAGNOSIS — J98.01 BRONCHOSPASM: ICD-10-CM

## 2025-03-24 RX ORDER — ALBUTEROL SULFATE 90 UG/1
2 INHALANT RESPIRATORY (INHALATION) EVERY 6 HOURS PRN
Qty: 8.5 G | Refills: 0 | Status: SHIPPED | OUTPATIENT
Start: 2025-03-24

## 2025-04-03 ENCOUNTER — OFFICE VISIT (OUTPATIENT)
Dept: DERMATOLOGY | Facility: CLINIC | Age: 70
End: 2025-04-03
Attending: DERMATOLOGY
Payer: COMMERCIAL

## 2025-04-03 DIAGNOSIS — D18.01 CHERRY ANGIOMA: ICD-10-CM

## 2025-04-03 DIAGNOSIS — L81.4 SOLAR LENTIGO: ICD-10-CM

## 2025-04-03 DIAGNOSIS — Z85.828 HISTORY OF NONMELANOMA SKIN CANCER: ICD-10-CM

## 2025-04-03 DIAGNOSIS — L57.0 ACTINIC KERATOSIS: ICD-10-CM

## 2025-04-03 DIAGNOSIS — D22.9 MULTIPLE BENIGN NEVI: ICD-10-CM

## 2025-04-03 DIAGNOSIS — L82.1 SEBORRHEIC KERATOSIS: ICD-10-CM

## 2025-04-03 DIAGNOSIS — L30.0 NUMMULAR ECZEMA: ICD-10-CM

## 2025-04-03 DIAGNOSIS — L82.0 INFLAMED SEBORRHEIC KERATOSIS: ICD-10-CM

## 2025-04-03 DIAGNOSIS — L11.1 GROVER'S DISEASE: ICD-10-CM

## 2025-04-03 DIAGNOSIS — Z86.006 HISTORY OF MELANOMA IN SITU: Primary | ICD-10-CM

## 2025-04-03 PROCEDURE — 17000 DESTRUCT PREMALG LESION: CPT | Mod: XS | Performed by: DERMATOLOGY

## 2025-04-03 PROCEDURE — 1126F AMNT PAIN NOTED NONE PRSNT: CPT | Performed by: DERMATOLOGY

## 2025-04-03 PROCEDURE — 17003 DESTRUCT PREMALG LES 2-14: CPT | Mod: XU | Performed by: DERMATOLOGY

## 2025-04-03 PROCEDURE — 99213 OFFICE O/P EST LOW 20 MIN: CPT | Mod: 25 | Performed by: DERMATOLOGY

## 2025-04-03 PROCEDURE — 17110 DESTRUCTION B9 LES UP TO 14: CPT | Mod: GC | Performed by: DERMATOLOGY

## 2025-04-03 RX ORDER — CLOBETASOL PROPIONATE 0.5 MG/G
OINTMENT TOPICAL
Qty: 90 G | Refills: 3 | Status: SHIPPED | OUTPATIENT
Start: 2025-04-03

## 2025-04-03 ASSESSMENT — PAIN SCALES - GENERAL: PAINLEVEL_OUTOF10: NO PAIN (0)

## 2025-04-03 NOTE — NURSING NOTE
Dermatology Rooming Note    Juan Miguel Donovan's goals for this visit include:   Chief Complaint   Patient presents with    Derm Problem     FBSE- spot of concern on forehead and scalp     EMERITA Vasquez

## 2025-04-03 NOTE — PATIENT INSTRUCTIONS
Overview  Skin cancer -- the abnormal growth of skin cells -- most often develops on skin exposed to the sun. But this common form of cancer can also occur on areas of your skin not ordinarily exposed to sunlight.    There are three major types of skin cancer -- basal cell carcinoma, squamous cell carcinoma and melanoma.    You can reduce your risk of skin cancer by limiting or avoiding exposure to ultraviolet (UV) radiation. Checking your skin for suspicious changes can help detect skin cancer at its earliest stages. Early detection of skin cancer gives you the greatest chance for successful skin cancer treatment.    Skin cancer develops primarily on areas of sun-exposed skin, including the scalp, face, lips, ears, neck, chest, arms and hands, and on the legs in women. But it can also form on areas that rarely see the light of day -- your palms, beneath your fingernails or toenails, and your genital area.    Skin cancer affects people of all skin tones, including those with darker complexions. When melanoma occurs in people with dark skin tones, it's more likely to occur in areas not normally exposed to the sun, such as the palms of the hands and soles of the feet.    Basal cell carcinoma  Basal cell carcinoma usually occurs in sun-exposed areas of your body, such as your neck or face.  Basal cell carcinoma may appear as:  A pearly or waxy bump  A flat, flesh-colored or brown scar-like lesion  A bleeding or scabbing sore that heals and returns    Squamous cell carcinoma  Most often, squamous cell carcinoma occurs on sun-exposed areas of your body, such as your face, ears and hands. People with darker skin are more likely to develop squamous cell carcinoma on areas that aren't often exposed to the sun.  Squamous cell carcinoma may appear as:  A firm, red nodule  A flat lesion with a scaly, crusted surface    Reach out to your doctor if you develop new spots that are growing, not healing, becoming painful, and/or  bleeding.       Learning the ABCDEs or Melanomas / Self Skin checks    Even if you have carefully practiced sun safety all summer, it's important to continue being vigilant about your skin in fall, winter, and beyond. Throughout the year, you should examine your skin head-to-toe once a month, looking for any suspicious lesions. Self-exams can help you identify potential skin cancers early, when they can almost always be completely cured. As a general rule, to spot either melanomas or non-melanoma skin cancers (such as basal cell carcinoma and squamous cell carcinoma), take note of any new moles or growths, and any existing growths that begin to grow or change significantly in any other way.  Lesions that change, itch, bleed, or don't heal are also alarm signals. Physicians have developed two specific strategies for early recognition ofmelanoma, the deadliest form of skin cancer: the ABCDEs and the Ugly Duckling sign.      Moles, brown spots and growths on the skin are usually harmless -- but not always. Anyone who has more than 100 moles is at greater risk for melanoma. The first signs can appear in one or more atypical moles. That's why it's so important to get to know your skin very well and to recognize any changes in the moles on your body. Look for the ABCDE signs of melanoma, and if you see one or more, make an appointment with a physician immediately.    Common, benign moles look the same over time. Be on alert when moles start to evolve or change and see a doctor. Any change -- in size, shape, color, elevation, or another trait, or any new symptom such as bleeding, itching or crusting -- points to danger.    A - Asymmetry  This benign mole is not asymmetrical. If you draw a line through the middle, the two sides will match, meaning it is symmetrical. If you draw a line through this mole, the two halves will not match, meaning it is asymmetrical, a warning sign for melanoma.    B - Border  A benign mole has  smooth, even borders, unlike melanomas. The borders of an early melanoma tend to be uneven. The edges may be scalloped or notched.     C - Color  Most benign moles are all one color -- often a single shade of brown. Having a variety of colors is another warning signal. A number of different shades of brown, tan or black could appear. A melanoma may also become red, white or blue.      D - Diameter  Benign moles usually have a smaller diameter than malignant ones. Melanomas usually are larger in diameter than the eraser on your pencil tip (  inch or 6mm), but they may sometimes be smaller when first detected.      E - Evolution  Common, benign moles look the same over time. Be on the alert when a mole starts to evolve or change in any way. When a mole is evolving, see a doctor. Any change -- in size, shape, color, elevation, or another trait, or any new symptom such as bleeding, itching or crusting -- points to danger.    The Ugly Duckling Rule  This is a simplified method where you look for any moles that do not match the other moles you have. Even if some of your moles look a little funny we are less concerned if they match one another. We are looking for the outlier - the one that is darker, larger, etc. In A, there is one dominant mole pattern with slight variation in size. The outlier lesion is clearly darker and larger than all other moles. In B, the patient has two predominant mole patterns, one with larger nevi and one with small, darker nevi. The outlier lesion is small but lacks pigmentation. In C, the patient shows only one lesion on the back. If this lesion is changing, symptomatic, or deemed atypical, it should be removed.      Seborrheic keratoses - a mimicker of melanoma    Seborrheic keratosis (seb-o-REE-ik care-uh-TOE-sis) is a common skin growth. It may seem worrisome because it can look like a wart, pre-cancerous skin growth (actinic keratosis), or skin cancer. Despite their appearance, seborrheic  "keratoses are harmless.    Most people get these growths when they are middle aged or older. Because they begin at a later age and can have a flat, waxy or wart-like appearance, seborrheic keratoses are often called the  barnacles of aging  or \"wisdom spots\".    It s possible to have just one of these growths, but most people develop several. Seborrheic keratoses range in color from white to black; however, most are tan or brown. You can find these harmless growths anywhere on the skin, except the palms and soles. Most often, you ll see them on the chest, back, head, or neck. Seborrheic keratoses are not contagious.      "

## 2025-04-03 NOTE — PROGRESS NOTES
[facility] Dermatology Note  Encounter Date: Apr 3, 2025  Office Visit     Dermatology Problem List:  1) Hx of NMSC  - nBCC, nasolabial fold s/p MMS 1/15/16  - nBCC, apical lip, s/p MMS 1/15/16  2) Hx of Melanoma in situ, left dorsal forearm, s/p excision 10 years ago   3 AKs  - s/p cryotherapy 4/5/18  - Has used efudex in the past  4) Nummular eczema   -clobetasol prn, moisturization  5) Rivera's disease     ____________________________________________    Assessment & Plan:     # Benign lesions:   Multiple benign nevi  Solar Lentigines  Seborrheic Keratoses  Cherry Angiomas    Explained to patient benign nature of lesion. No treatment is necessary at this time unless the lesion changes or becomes symptomatic.   - ABCDEs of melanoma reviewed  - signs and symptoms of NMSC reviewed  - Sun precaution was advised including the use of sun screens of SPF 30 or higher and sun protective clothing    # Inflamed SKs  L frontal scalp, L medial thigh. Benign etiology discussed. Cryo'd    #Ak's x2  Temples bilaterally. Etio discussed. Treated with cryo.    #Nummular Eczema  Well controlled with PRN clobetasol and moisturization. Minimal activity on exam  -clobetasol ointment BID PRN  -emollients, liberally    #Hx MIS  #Hx NMSC  NERD. Annual checks    #[place]'s  Not botheresome. Etiology discussed.      Procedures Performed:   - Cryotherapy procedure note, location(s): L frontal scalp, L medial thigh, L and R temples    After verbal consent and discussion of risks and benefits including, but not limited to, dyspigmentation/scar, blister, and pain, 4 lesion(s) was(were) treated with 1-2 mm freeze border for 1-2 cycles with liquid nitrogen. Post cryotherapy instructions were provided.    Follow-up: 1 year(s) in-person, or earlier for new or changing lesions    Staff and Resident:     Yovani Chawla MD  Internal Medicine-Dermatology PGY-3    Staff: Neno     I have seen and examined this patient and agree with the  assessment and plan as documented in the resident's note.    Otto Lazcano MD  Dermatology Attending  ____________________________________________    CC: Derm Problem (FBSE- spot of concern on forehead and scalp)    HPI:  Mr. Juan Miguel Donovan is a(n) 69 year old male who presents today as a return patient for skin check  -Last seen 3.2024 at which time Aks treated on scalp. Noted to have nummular eczema. Advised to use clobetasol.     -Today, notes a spot on his left thigh he would like treated. Also notes spot on L frontal scalp that was frozen last time but persists. Gets irritated, itchy.  -No painful, bleeding, non healing spots. No new moles  -Using clobetasol as needed for nummular derm on hands/ feet. Moisturizing.  -not bothered by eroded papules on trunk    Patient is otherwise feeling well, without additional skin concerns.    Labs Reviewed:  na Reviewed in EMR     Physical Exam:  Vitals: There were no vitals taken for this visit.  SKIN: Total skin excluding the undergarment areas was performed. The exam included the head/face, neck, both arms, chest, back, abdomen, both legs, digits and/or nails.   - On the left frontal scalp is a grey waxy stuck on plaque   -On the left medial thigh is a verrucoid white grey stuck on papule  -few gritty macules on the temples  -prior MIS site on left forearm- NERD  - wobbly symmetric tan papules on the bilateral cheeks  -few eroded eczematous papules on dorsal hands  -few eroded monomorphic pink papules un trunk  -scattered waxy, stuck on papules and plaques on the trunk and extremities  -tan circular macules in photodistributed areas of face, chest, back, and extremities  -pinpoint red macules and few dome shaped red papules on the trunk and extremities  -few symmetric brown macules with reassuring dermatoscopic features on the trunk and extremities  - No other lesions of concern on areas examined.     Medications:  Current Outpatient Medications   Medication Sig  Dispense Refill    albuterol (PROAIR HFA/PROVENTIL HFA/VENTOLIN HFA) 108 (90 Base) MCG/ACT inhaler Inhale 2 puffs into the lungs every 6 hours as needed for shortness of breath. 8.5 g 0    atorvastatin (LIPITOR) 40 MG tablet Take 1 tablet (40 mg) by mouth daily. 90 tablet 3    clobetasol (TEMOVATE) 0.05 % external ointment USE ON AFFECTED AREA UP TO TWICE A DAY FOR NO MORE THAN 2 WEEKS. AVOID FACE,NECK,GROIN AND UNDERARMS 90 g 3    Multiple Vitamins-Minerals (MULTIVITAMIN OR) Take 1 tablet by mouth daily      VITAMIN D, CHOLECALCIFEROL, PO Take 2,000 Units by mouth daily      triamcinolone (KENALOG) 0.1 % external ointment Apply twice daily as needed (Patient not taking: Reported on 4/3/2025) 80 g 5     No current facility-administered medications for this visit.      Past Medical History:   Patient Active Problem List   Diagnosis    Hepatitis B immune    Melanoma (H)    Bronchospasm    BCC (basal cell carcinoma), face    LTBI (latent tuberculosis infection)    ACP (advance care planning)    Paresthesia of both feet    AK (actinic keratosis)    Nummular eczema    History of nonmelanoma skin cancer    History of melanoma in situ    Elevated prostate specific antigen (PSA)    Elevated TSH    Seborrheic keratosis    Multiple melanocytic nevi    Mild intermittent asthma with exacerbation    Routine general medical examination at a health care facility    Prediabetes    Left shoulder pain    Stage 3a chronic kidney disease (H)     Past Medical History:   Diagnosis Date    Basal cell carcinoma     Cancer (H)     melanoma - L arm - 2006    Epididymitis, right     Malignant melanoma (H)     Malignant melanoma nos     Mumps     Prostate infection     Uncomplicated asthma        CC Otto Lazcano MD  [address] [facility] [address] [facility] [address]  [place] [address] on close of this encounter.

## 2025-04-03 NOTE — LETTER
4/3/2025       RE: Juan Miguel Donovan  [address] [place] [address]     Dear Colleague,    Thank you for referring your patient, Juan Miguel Donovan, to the [facility]ERMAT[facility] [place] at [facility]. Please see a copy of my visit note below.    [facility] Dermatology Note  Encounter Date: Apr 3, 2025  Office Visit     Dermatology Problem List:  1) Hx of NMSC  - nBCC, nasolabial fold s/p MMS 1/15/16  - nBCC, apical lip, s/p MMS 1/15/16  2) Hx of Melanoma in situ, left dorsal forearm, s/p excision 10 years ago   3 AKs  - s/p cryotherapy 4/5/18  - Has used efudex in the past  4) Nummular eczema   -clobetasol prn, moisturization  5) [place]'s disease     ____________________________________________    Assessment & Plan:     # Benign lesions:   Multiple benign nevi  Solar Lentigines  Seborrheic Keratoses  Cherry Angiomas    Explained to patient benign nature of lesion. No treatment is necessary at this time unless the lesion changes or becomes symptomatic.   - ABCDEs of melanoma reviewed  - signs and symptoms of NMSC reviewed  - Sun precaution was advised including the use of sun screens of SPF 30 or higher and sun protective clothing    # Inflamed SKs  L frontal scalp, L medial thigh. Benign etiology discussed. Cryo'd    #Ak's x2  Temples bilaterally. Etio discussed. Treated with cryo.    #Nummular Eczema  Well controlled with PRN clobetasol and moisturization. Minimal activity on exam  -clobetasol ointment BID PRN  -emollients, liberally    #Hx MIS  #Hx NMSC  NERD. Annual checks    #Rivera's  Not botheresome. Etiology discussed.      Procedures Performed:   - Cryotherapy procedure note, location(s): L frontal scalp, L medial thigh, L and R temples    After verbal consent and discussion of risks and benefits including, but not limited to, dyspigmentation/scar, blister, and pain, 4 lesion(s) was(were) treated with 1-2 mm freeze border  for 1-2 cycles with liquid nitrogen. Post cryotherapy instructions were provided.    Follow-up: 1 year(s) in-person, or earlier for new or changing lesions    Staff and Resident:     Yovani Chawla MD  Internal Medicine-Dermatology PGY-3    Staff: Neno     I have seen and examined this patient and agree with the assessment and plan as documented in the resident's note.    Otto Lazcano MD  Dermatology Attending  ____________________________________________    CC: Derm Problem (FBSE- spot of concern on forehead and scalp)    HPI:  Mr. Juan Miguel Donovan is a(n) 69 year old male who presents today as a return patient for skin check  -Last seen 3.2024 at which time Aks treated on scalp. Noted to have nummular eczema. Advised to use clobetasol.     -Today, notes a spot on his left thigh he would like treated. Also notes spot on L frontal scalp that was frozen last time but persists. Gets irritated, itchy.  -No painful, bleeding, non healing spots. No new moles  -Using clobetasol as needed for nummular derm on hands/ feet. Moisturizing.  -not bothered by eroded papules on trunk    Patient is otherwise feeling well, without additional skin concerns.    Labs Reviewed:  na Reviewed in EMR     Physical Exam:  Vitals: There were no vitals taken for this visit.  SKIN: Total skin excluding the undergarment areas was performed. The exam included the head/face, neck, both arms, chest, back, abdomen, both legs, digits and/or nails.   - On the left frontal scalp is a grey waxy stuck on plaque   -On the left medial thigh is a verrucoid white grey stuck on papule  -few gritty macules on the temples  -prior MIS site on left forearm- NERD  - wobbly symmetric tan papules on the bilateral cheeks  -few eroded eczematous papules on dorsal hands  -few eroded monomorphic pink papules un trunk  -scattered waxy, stuck on papules and plaques on the trunk and extremities  -tan circular macules in photodistributed areas of face, chest, back, and  extremities  -pinpoint red macules and few dome shaped red papules on the trunk and extremities  -few symmetric brown macules with reassuring dermatoscopic features on the trunk and extremities  - No other lesions of concern on areas examined.     Medications:  Current Outpatient Medications   Medication Sig Dispense Refill     albuterol (PROAIR HFA/PROVENTIL HFA/VENTOLIN HFA) 108 (90 Base) MCG/ACT inhaler Inhale 2 puffs into the lungs every 6 hours as needed for shortness of breath. 8.5 g 0     atorvastatin (LIPITOR) 40 MG tablet Take 1 tablet (40 mg) by mouth daily. 90 tablet 3     clobetasol (TEMOVATE) 0.05 % external ointment USE ON AFFECTED AREA UP TO TWICE A DAY FOR NO MORE THAN 2 WEEKS. AVOID FACE,NECK,GROIN AND UNDERARMS 90 g 3     Multiple Vitamins-Minerals (MULTIVITAMIN OR) Take 1 tablet by mouth daily       VITAMIN D, CHOLECALCIFEROL, PO Take 2,000 Units by mouth daily       triamcinolone (KENALOG) 0.1 % external ointment Apply twice daily as needed (Patient not taking: Reported on 4/3/2025) 80 g 5     No current facility-administered medications for this visit.      Past Medical History:   Patient Active Problem List   Diagnosis     Hepatitis B immune     Melanoma (H)     Bronchospasm     BCC (basal cell carcinoma), face     LTBI (latent tuberculosis infection)     ACP (advance care planning)     Paresthesia of both feet     AK (actinic keratosis)     Nummular eczema     History of nonmelanoma skin cancer     History of melanoma in situ     Elevated prostate specific antigen (PSA)     Elevated TSH     Seborrheic keratosis     Multiple melanocytic nevi     Mild intermittent asthma with exacerbation     Routine general medical examination at a health care facility     Prediabetes     Left shoulder pain     Stage 3a chronic kidney disease (H)     Past Medical History:   Diagnosis Date     Basal cell carcinoma      Cancer (H)     melanoma - L arm - 2006     Epididymitis, right      Malignant melanoma (H)       Malignant melanoma nos      Mumps      Prostate infection      Uncomplicated asthma        CC Otto Lazcano MD  [address] [facility] [address]  [place] [address] on close of this encounter.      Again, thank you for allowing me to participate in the care of your patient.      Sincerely,    Otto Lazcano MD

## 2025-04-27 PROBLEM — L82.0 INFLAMED SEBORRHEIC KERATOSIS: Status: ACTIVE | Noted: 2022-10-28

## 2025-08-07 ENCOUNTER — MYC REFILL (OUTPATIENT)
Dept: FAMILY MEDICINE | Facility: CLINIC | Age: 70
End: 2025-08-07
Payer: COMMERCIAL

## 2025-08-07 DIAGNOSIS — E78.00 HYPERCHOLESTEREMIA: ICD-10-CM

## 2025-08-07 RX ORDER — ATORVASTATIN CALCIUM 40 MG/1
40 TABLET, FILM COATED ORAL DAILY
Qty: 90 TABLET | Refills: 0 | Status: SHIPPED | OUTPATIENT
Start: 2025-08-07